# Patient Record
Sex: MALE | Race: WHITE | HISPANIC OR LATINO | Employment: FULL TIME | URBAN - METROPOLITAN AREA
[De-identification: names, ages, dates, MRNs, and addresses within clinical notes are randomized per-mention and may not be internally consistent; named-entity substitution may affect disease eponyms.]

---
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
## 2018-05-18 ENCOUNTER — OFFICE VISIT (OUTPATIENT)
Dept: URGENT CARE | Facility: PHYSICIAN GROUP | Age: 18
End: 2018-05-18
Payer: COMMERCIAL

## 2018-05-18 VITALS
OXYGEN SATURATION: 99 % | HEIGHT: 66 IN | DIASTOLIC BLOOD PRESSURE: 78 MMHG | BODY MASS INDEX: 25.71 KG/M2 | SYSTOLIC BLOOD PRESSURE: 116 MMHG | TEMPERATURE: 98.4 F | WEIGHT: 160 LBS | HEART RATE: 84 BPM

## 2018-05-18 DIAGNOSIS — M54.42 ACUTE LEFT-SIDED LOW BACK PAIN WITH LEFT-SIDED SCIATICA: ICD-10-CM

## 2018-05-18 PROCEDURE — 99204 OFFICE O/P NEW MOD 45 MIN: CPT | Performed by: FAMILY MEDICINE

## 2018-05-18 RX ORDER — IBUPROFEN 200 MG
200 TABLET ORAL EVERY 6 HOURS PRN
COMMUNITY
End: 2018-05-18

## 2018-05-18 RX ORDER — CYCLOBENZAPRINE HCL 10 MG
10 TABLET ORAL
Qty: 15 TAB | Refills: 0 | Status: SHIPPED | OUTPATIENT
Start: 2018-05-18 | End: 2018-06-25

## 2018-05-18 RX ORDER — MELOXICAM 15 MG/1
15 TABLET ORAL DAILY
Qty: 30 TAB | Refills: 0 | Status: SHIPPED | OUTPATIENT
Start: 2018-05-18 | End: 2018-06-25 | Stop reason: SDUPTHER

## 2018-05-18 ASSESSMENT — PAIN SCALES - GENERAL: PAINLEVEL: 9=SEVERE PAIN

## 2018-05-31 ASSESSMENT — ENCOUNTER SYMPTOMS
NUMBNESS: 0
VOMITING: 0
DIZZINESS: 0
CHILLS: 0
SHORTNESS OF BREATH: 0
SORE THROAT: 0
MYALGIAS: 0
EYE PAIN: 0
HIP PAIN: 1
NAUSEA: 0
FEVER: 0
WEAKNESS: 0

## 2018-06-01 NOTE — PROGRESS NOTES
"  Subjective:     Paxton Gómez is a 18 y.o. male who presents for Hip Pain (Left side. Glut pain. x 1-2 years)       Hip Pain   This is a new problem. The current episode started in the past 7 days. The problem occurs constantly. The problem has been rapidly worsening. Pertinent negatives include no chest pain, chills, fever, myalgias, nausea, numbness, rash, sore throat, vomiting or weakness.     Past Medical History:   Diagnosis Date   • NEGATIVE HISTORY OF    History reviewed. No pertinent surgical history.  Social History     Social History   • Marital status: Single     Spouse name: N/A   • Number of children: N/A   • Years of education: N/A     Occupational History   • Not on file.     Social History Main Topics   • Smoking status: Never Smoker   • Smokeless tobacco: Never Used   • Alcohol use No   • Drug use: No   • Sexual activity: No     Other Topics Concern   • Not on file     Social History Narrative    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     Will be playing soccer.     Gets good grades, mostly A's and B's.               Family History   Problem Relation Age of Onset   • Genitourinary () Neg Hx    • Cancer Neg Hx    • Diabetes Neg Hx    • Stroke Neg Hx    • Heart Disease Neg Hx     Review of Systems   Constitutional: Negative for chills and fever.   HENT: Negative for sore throat.    Eyes: Negative for pain.   Respiratory: Negative for shortness of breath.    Cardiovascular: Negative for chest pain.   Gastrointestinal: Negative for nausea and vomiting.   Genitourinary: Negative for hematuria.   Musculoskeletal: Negative for myalgias.   Skin: Negative for rash.   Neurological: Negative for dizziness, weakness and numbness.   No Known Allergies   Objective:   /78   Pulse 84   Temp 36.9 °C (98.4 °F)   Ht 1.676 m (5' 6\")   Wt 72.6 kg (160 lb)   SpO2 99%   BMI 25.82 kg/m²   Physical Exam   Constitutional: He is oriented to person, place, and time. He appears well-developed and well-nourished. No " distress.   HENT:   Head: Normocephalic and atraumatic.   Eyes: Conjunctivae and EOM are normal. Pupils are equal, round, and reactive to light.   Cardiovascular: Normal rate, regular rhythm and intact distal pulses.    No murmur heard.  Pulmonary/Chest: Effort normal and breath sounds normal. No respiratory distress.   Abdominal: Soft. He exhibits no distension. There is no tenderness.   Musculoskeletal:        Left hip: He exhibits tenderness. He exhibits normal range of motion, normal strength and no bony tenderness.        Lumbar back: He exhibits decreased range of motion, tenderness and spasm. He exhibits no bony tenderness.   Neurological: He is alert and oriented to person, place, and time. He has normal reflexes. No sensory deficit.   Skin: Skin is warm and dry.   Psychiatric: He has a normal mood and affect. His behavior is normal.   Vitals reviewed.        Assessment/Plan:   Assessment    1. Acute left-sided low back pain with left-sided sciatica  - meloxicam (MOBIC) 15 MG tablet; Take 1 Tab by mouth every day.  Dispense: 30 Tab; Refill: 0  - cyclobenzaprine (FLEXERIL) 10 MG Tab; Take 1 Tab by mouth at bedtime as needed.  Dispense: 15 Tab; Refill: 0    Differential diagnosis, natural history, supportive care, and indications for immediate follow-up discussed.

## 2018-06-25 ENCOUNTER — OFFICE VISIT (OUTPATIENT)
Dept: MEDICAL GROUP | Age: 18
End: 2018-06-25
Payer: COMMERCIAL

## 2018-06-25 VITALS
TEMPERATURE: 98.9 F | WEIGHT: 152.6 LBS | SYSTOLIC BLOOD PRESSURE: 100 MMHG | DIASTOLIC BLOOD PRESSURE: 64 MMHG | HEIGHT: 66 IN | BODY MASS INDEX: 24.53 KG/M2 | OXYGEN SATURATION: 96 % | HEART RATE: 94 BPM

## 2018-06-25 DIAGNOSIS — Z23 NEED FOR VACCINATION: ICD-10-CM

## 2018-06-25 DIAGNOSIS — G57.02 PIRIFORMIS SYNDROME OF LEFT SIDE: ICD-10-CM

## 2018-06-25 DIAGNOSIS — B07.0 PLANTAR WART: ICD-10-CM

## 2018-06-25 DIAGNOSIS — M54.42 ACUTE LEFT-SIDED LOW BACK PAIN WITH LEFT-SIDED SCIATICA: ICD-10-CM

## 2018-06-25 PROBLEM — M54.32 LEFT SIDED SCIATICA: Status: ACTIVE | Noted: 2018-06-25

## 2018-06-25 PROCEDURE — 90471 IMMUNIZATION ADMIN: CPT | Performed by: PHYSICIAN ASSISTANT

## 2018-06-25 PROCEDURE — 90734 MENACWYD/MENACWYCRM VACC IM: CPT | Performed by: PHYSICIAN ASSISTANT

## 2018-06-25 PROCEDURE — 90472 IMMUNIZATION ADMIN EACH ADD: CPT | Performed by: PHYSICIAN ASSISTANT

## 2018-06-25 PROCEDURE — 90651 9VHPV VACCINE 2/3 DOSE IM: CPT | Performed by: PHYSICIAN ASSISTANT

## 2018-06-25 PROCEDURE — 99213 OFFICE O/P EST LOW 20 MIN: CPT | Mod: 25 | Performed by: PHYSICIAN ASSISTANT

## 2018-06-25 PROCEDURE — 17110 DESTRUCTION B9 LES UP TO 14: CPT | Performed by: PHYSICIAN ASSISTANT

## 2018-06-25 RX ORDER — MELOXICAM 15 MG/1
15 TABLET ORAL DAILY
Qty: 30 TAB | Refills: 0 | Status: SHIPPED | OUTPATIENT
Start: 2018-06-25 | End: 2018-09-11

## 2018-06-25 RX ORDER — METHOCARBAMOL 500 MG/1
500 TABLET, FILM COATED ORAL 3 TIMES DAILY
Qty: 30 TAB | Refills: 0 | Status: SHIPPED | OUTPATIENT
Start: 2018-06-25 | End: 2018-07-23

## 2018-06-25 ASSESSMENT — PATIENT HEALTH QUESTIONNAIRE - PHQ9: CLINICAL INTERPRETATION OF PHQ2 SCORE: 0

## 2018-06-27 PROBLEM — B07.0 PLANTAR WART: Status: ACTIVE | Noted: 2018-06-27

## 2018-06-27 NOTE — ASSESSMENT & PLAN NOTE
This is an ongoing problem.  The patient reports that he was seen for the same problem in urgent care approximately 4 weeks ago, and was given meloxicam and Flexeril at that time.  He took 1 of the Flexeril, and had an allergic reaction which involved swelling in his face.  He did not take anymore.  Since then, he has been using the meloxicam and feels that this does improve his symptoms.  Initially, he did have some numbness and tingling of the left leg, but this has improved.    His pain symptoms are largely improved as well, and he feels that he is making progress.  However, he still feels nervous about bending over to lift.  He tells me that with this motion, he feels a pulling sensation in his left buttock.  He also has tightness in his left thoracic and lumbar paraspinal muscles.  He has been exercising, but not as frequently as previous to this injury.  He does minimal at home stretching, and has been trying to rest.  He believes the initial injury happened during weight lifting in the gym.

## 2018-06-27 NOTE — ASSESSMENT & PLAN NOTE
"This is a new problem.  The patient displays a wartlike growth on the volar aspect of his right second toe.  He tells me it has been there \"for a while.\"  He does not remember when he first noticed it.  He does not feel like it is growing significantly, but he wonders if there is anything that can be done with it.  He has not tried any over-the-counter remedies for this.  "

## 2018-06-27 NOTE — PROGRESS NOTES
"CC: left sided piriformis syndrome, plantar wart, need for vaccination    History of Present Illness: This is a 18 y.o. male new patient who presents today to establish care and discuss the chronic conditions outlined below. This patient previously saw no one for primary care. Other specialists include none. Records for all have been requested. This patient has a past medical history significant for none.    Piriformis syndrome of left side  This is an ongoing problem.  The patient reports that he was seen for the same problem in urgent care approximately 4 weeks ago, and was given meloxicam and Flexeril at that time.  He took 1 of the Flexeril, and had an allergic reaction which involved swelling in his face.  He did not take anymore.  Since then, he has been using the meloxicam and feels that this does improve his symptoms.  Initially, he did have some numbness and tingling of the left leg, but this has improved.    His pain symptoms are largely improved as well, and he feels that he is making progress.  However, he still feels nervous about bending over to lift.  He tells me that with this motion, he feels a pulling sensation in his left buttock.  He also has tightness in his left thoracic and lumbar paraspinal muscles.  He has been exercising, but not as frequently as previous to this injury.  He does minimal at home stretching, and has been trying to rest.  He believes the initial injury happened during weight lifting in the gym.    Plantar wart  This is a new problem.  The patient displays a wartlike growth on the volar aspect of his right second toe.  He tells me it has been there \"for a while.\"  He does not remember when he first noticed it.  He does not feel like it is growing significantly, but he wonders if there is anything that can be done with it.  He has not tried any over-the-counter remedies for this.      Patient Active Problem List    Diagnosis Date Noted   • Plantar wart 06/27/2018   • Piriformis " syndrome of left side 06/25/2018          Additional History:     Allergies   Allergen Reactions   • Cyclobenzaprine Swelling       Current medicines (including changes today)  Current Outpatient Prescriptions   Medication Sig Dispense Refill   • methocarbamol (ROBAXIN) 500 MG Tab Take 1 Tab by mouth 3 times a day. 30 Tab 0   • meloxicam (MOBIC) 15 MG tablet Take 1 Tab by mouth every day. 30 Tab 0     No current facility-administered medications for this visit.      He  has a past medical history of Left sided sciatica (6/25/2018); NEGATIVE HISTORY OF; and Plantar wart (6/27/2018). He also has no past medical history of Asthma; Cancer (HCC); Diabetes (HCC); Hyperlipidemia; or Hypertension.  He  has no past surgical history on file.  Social History   Substance Use Topics   • Smoking status: Never Smoker   • Smokeless tobacco: Never Used   • Alcohol use No       Family History   Problem Relation Age of Onset   • Diabetes Father    • Diabetes Maternal Grandmother    • Diabetes Maternal Grandfather    • Diabetes Paternal Grandmother    • Diabetes Paternal Grandfather    • Genitourinary () Neg Hx    • Cancer Neg Hx    • Stroke Neg Hx    • Heart Disease Neg Hx      Family Status   Relation Status   • Mother Alive   • Father Alive   • Maternal Grandmother    • Maternal Grandfather    • Paternal Grandmother    • Paternal Grandfather    • Neg Hx        Patient Active Problem List    Diagnosis Date Noted   • Plantar wart 06/27/2018   • Piriformis syndrome of left side 06/25/2018         Review of Systems:   Constitutional: Negative for fever, chills, unexpected weight change, fatigue, malaise and generalized weakness.   Eyes: Negative for blurred or double vision, eye pain, eye discharge.  ENT: Negative for headaches, hearing changes, ear pain, ear discharge, rhinorrhea, sinus congestion, sore throat, and neck pain.   Respiratory: Negative for cough, sputum production, chest congestion, dyspnea, wheezing, and crackles.  "  Cardiovascular: Negative for chest pain, palpitations, orthopnea, and bilateral lower extremity edema.   Gastrointestinal: Negative for heartburn, nausea, vomiting, abdominal pain, hematochezia, melena, diarrhea, constipation, and greasy/foul-smelling stools.   Genitourinary: Negative for dysuria, polyuria, hematuria, pyuria, urgency, frequency and incontinence.  Musculoskeletal: Positive for left sided piriformis syndrome symptoms as in HPI. Negative for myalgias, back pain, and joint pain.   Skin: Plantar wart of right second toe. Negative for rash, itching, cyanotic skin color change.   Neurological: Negative for dizziness, tingling, tremors, focal sensory deficit, focal weakness and headaches.   Heme: Does not bruise/bleed easily.    Endocrine: Negative for heat or cold intolerance, polydipsia, polyuria.  Psychiatric/Behavioral: Negative for depression, suicidal or homicidal ideation and memory loss.         Physical Exam:   Vitals: Blood pressure 100/64, pulse 94, temperature 37.2 °C (98.9 °F), height 1.683 m (5' 6.25\"), weight 69.2 kg (152 lb 9.6 oz), SpO2 96 %.  BMI: Body mass index is 24.44 kg/m².  General/Constitutional: Vitals as above, well nourished, well developed male in no acute distress  Head: Head is grossly normal & atraumatic.  Eyes: Bilateral conjunctivae clear and not injected, bilateral EOMI, bilateral PERRL  ENT: Bilateral external ears grossly normal in appearance, EACs clear & bilateral TMs visualized with appropriate cone of light reflex, hearing grossly intact. External nares normal in appearance and without discharge or bleeding, bilateral turbinates without erythema or edema and without discharge or bleeding. Good dentition, posterior oropharynx without erythema/edema/exudates.  Neck: Neck supple, no masses, neck non-tender to palpation, no thyromegaly/goiter.  Lymph: No adenopathy in anterior/posterior cervical and supra-/infrascapular nodes.   Respiratory: Normal effort, lungs are " clear to auscultation in all fields (anterior, lateral, posterior), no wheezing, rhonchi or rales.  Cardiovascular: Regular rate and rhythm without murmurs, gallops or rubs, no bilateral lower extremity edema.  Musculoskeletal: Gait grossly normal & not antalgic, no tenderness to percussion of vertebral processes, no CVAT.  Back Pain Exam of Thoracic and Lumbar Spine  Inspection of back and posture - revealed a normal exam  Range of motion = 180 degrees bilaterally  Palpation of the spinous processes = NTTP  Back:  Back symmetric, no curvature. ROM normal. No CVA tenderness.  No spasms noted, no lumbar spine tenderness, no saddle anasthesia, able to dorsiflex bilateral toes, 2+DTRs (patellar) bilaterally, Negative straight leg raise bilaterally both supine and seated (though patient does note some pulling sensation with left sided supine SLR), nontender bilateral erector spinae, normal gait. Tender to palpation at left lateral hip and paraspinal muscles around the level of T10-L1.  Skin: Plantar wart of volar aspect of right second toe. Warm and dry with no apparent rashes or lesions.  Neuro: Gross motor movement intact in all 4 extremities, gross sensation intact to extremities and trunk, gait grossly normal and not antalgic.  Cranial nerve examination: Pupils equally round and react to light. Extraocular muscles are intact. Visual fields intact. No facial droop. Hearing intact to conversation. Soft palate rises symmetrically bilaterally with uvula midline. Tongue midline and cranial nerve 12 intact. No abnormal facial movements. Resisted shoulder shrug 5/5 bilaterally.  Psych: Judgment grossly appropriate, no apparent depression/anxiety.    Cryotherapy was administered in three rounds to the plantar wart of the right great toe. The patient tolerated this procedure well without complications or complaints. He will return in three weeks for recheck of the lesion.    Health Maintenance: Immunizations updated today.      Imaging/Labs:  N/A    Assessment/Plan:  Care has been established  We reviewed USPSTF guidelines  Records requests sent to previous care providers  Denies intimate partner violence    1. Piriformis syndrome of left side  Improving.  The patient would like a refill on the meloxicam, as he is almost out.  I also think he will benefit from use of a muscle relaxer, as he was unable to use the Flexeril due to allergy.  He also requests a referral to physical therapy to work on bilateral strengthening exercises.  I am concerned that he is doing a little bit of compensation on his right side which may cause imbalance issues down the road.  - REFERRAL TO PHYSICAL THERAPY Reason for Therapy: Eval/Treat/Report  - methocarbamol (ROBAXIN) 500 MG Tab; Take 1 Tab by mouth 3 times a day.  Dispense: 30 Tab; Refill: 0  - meloxicam (MOBIC) 15 MG tablet; Take 1 Tab by mouth every day.  Dispense: 30 Tab; Refill: 0    2. Need for vaccination  - [id number]  - Gardasil 9    3. Plantar wart  Cryotherapy administered to the lesion.  Will recheck in 3 weeks.      Return in about 3 weeks (around 7/16/2018).      Please note that this dictation was created using voice recognition software. I have made every reasonable attempt to correct obvious errors, but I expect that there are errors of grammar and possibly content that I did not discover before finalizing the note.

## 2018-07-20 ENCOUNTER — OFFICE VISIT (OUTPATIENT)
Dept: MEDICAL GROUP | Age: 18
End: 2018-07-20
Payer: COMMERCIAL

## 2018-07-20 VITALS
OXYGEN SATURATION: 97 % | BODY MASS INDEX: 25.39 KG/M2 | SYSTOLIC BLOOD PRESSURE: 118 MMHG | TEMPERATURE: 98.3 F | HEART RATE: 78 BPM | DIASTOLIC BLOOD PRESSURE: 76 MMHG | HEIGHT: 66 IN | WEIGHT: 158 LBS

## 2018-07-20 DIAGNOSIS — M40.203 KYPHOSIS OF CERVICOTHORACIC REGION, UNSPECIFIED KYPHOSIS TYPE: ICD-10-CM

## 2018-07-20 DIAGNOSIS — G57.02 PIRIFORMIS SYNDROME OF LEFT SIDE: ICD-10-CM

## 2018-07-20 DIAGNOSIS — B07.0 PLANTAR WART: ICD-10-CM

## 2018-07-20 PROCEDURE — 17110 DESTRUCTION B9 LES UP TO 14: CPT | Performed by: PHYSICIAN ASSISTANT

## 2018-07-20 PROCEDURE — 99214 OFFICE O/P EST MOD 30 MIN: CPT | Mod: 25 | Performed by: PHYSICIAN ASSISTANT

## 2018-07-20 NOTE — ASSESSMENT & PLAN NOTE
"This is an ongoing problem.  The patient reports that his tailbone hurts when bending over, and describes a \"pulling\" pain in lower left side of back.  Essentially, this has been ongoing for the past year with an exacerbation that began approximately 3 months ago.  3 months ago, this pain became severe and was worse with squatting and bending.  This exacerbation has seemed to resolve, and the patient reports that he cannot squat with no pain.  However, bending still elicits this pulling sensation, and he is concerned.    He denies any unilateral leg symptoms like numbness, or tingling.  He denies any incontinence of bowel or bladder.  He reports that this pain is bothersome, but does not significantly impact his day-to-day life.  "

## 2018-07-23 PROBLEM — M40.203 KYPHOSIS OF CERVICOTHORACIC REGION: Status: ACTIVE | Noted: 2018-07-23

## 2018-07-23 NOTE — PROGRESS NOTES
"CC: piriformis syndrome.     History of Present Illness: This is a 18 y.o. male established patient who presents today for follow up.     Piriformis syndrome of left side  This is an ongoing problem.  The patient reports that his tailbone hurts when bending over, and describes a \"pulling\" pain in lower left side of back.  Essentially, this has been ongoing for the past year with an exacerbation that began approximately 3 months ago.  3 months ago, this pain became severe and was worse with squatting and bending.  This exacerbation has seemed to resolve, and the patient reports that he cannot squat with no pain.  However, bending still elicits this pulling sensation, and he is concerned.    He denies any unilateral leg symptoms like numbness, or tingling.  He denies any incontinence of bowel or bladder.  He reports that this pain is bothersome, but does not significantly impact his day-to-day life.    Plantar wart  Lesion still present on the volar aspect of his right second toe.  He reports no change since her last cryotherapy application, and does not have any tools at home to manage this.  We discussed that he may get a file and over-the-counter but awful and to help rid himself of this work on his own if he does not want to return for further cryo treatments.    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  The patient has a markedly kyphotic cervicothoracic spine, and reports that he has always had this.  He does not have any significant pain due to this, but is concerned that he will develop problems later in his life.  He is interested in a referral to orthopedics for consultation.      Patient Active Problem List    Diagnosis Date Noted   • Kyphosis of cervicothoracic region 07/23/2018   • Plantar wart 06/27/2018   • Piriformis syndrome of left side 06/25/2018      Allergies:Cyclobenzaprine    Current Outpatient Prescriptions   Medication Sig Dispense Refill   • meloxicam (MOBIC) 15 MG " "tablet Take 1 Tab by mouth every day. 30 Tab 0     No current facility-administered medications for this visit.        Social History   Substance Use Topics   • Smoking status: Never Smoker   • Smokeless tobacco: Never Used   • Alcohol use No     Social History     Social History Narrative    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     Will be playing soccer.     Gets good grades, mostly A's and B's.                Family History   Problem Relation Age of Onset   • Diabetes Father    • Diabetes Maternal Grandmother    • Diabetes Maternal Grandfather    • Diabetes Paternal Grandmother    • Diabetes Paternal Grandfather    • Genitourinary () Neg Hx    • Cancer Neg Hx    • Stroke Neg Hx    • Heart Disease Neg Hx        Review of Systems:    Constitutional: Negative for fever, chills.   Musculoskeletal: Positive for left lower back pain, exaggerated kyphosis of cericothoracic spine. Negative for myalgias, and joint pain.   Skin: Negative for rash, itching, cyanotic skin color change.   Neurological: Negative for dizziness, tingling, tremors, focal sensory deficit, focal weakness and headaches.   Psychiatric/Behavioral: Negative for depression, suicidal/homicidal ideation and memory loss.            Exam:    Blood pressure 118/76, pulse 78, temperature 36.8 °C (98.3 °F), height 1.683 m (5' 6.25\"), weight 71.7 kg (158 lb), SpO2 97 %. Body mass index is 25.31 kg/m².    General: Normal appearing. No distress.  Eyes: Conjunctiva clear, lids without ptosis, pupils equal, round and reactive to light  Musculoskeletal: Normal gait. No extremity cyanosis, clubbing, or edema.  Back Pain Exam of Thoracic and Lumbar Spine  Inspection of back and posture - revealed a normal exam  Range of motion = 180 degrees bilaterally  Palpation of the spinous processes = NTTP  Back:  positive findings: exaggerated kyphosis of cervicothoracic spine.  No spasms noted, no lumbar spine tenderness, no saddle anasthesia, able to dorsiflex bilateral toes, 2+DTRs " (patellar) bilaterally, negative straight leg raise bilaterally both supine and seated, nontender bilateral erector spinae, normal gait. Tender to palpation over left SI joint.   Neurologic: Grossly non-focal, DTRs intact.  Skin: Positive for plantar wart of right second toe, unchanged from previous visit. Warm and dry.  No obvious lesions.  Psych: Normal mood and affect. Alert and oriented x3. Judgment and insight is normal.      Health Maintenance: Completed    Assessment/Plan:  1. Piriformis syndrome of left side  Uncontrolled.  We will check an x-ray to determine if there is any bony abnormality of his left SI joint.   - DX-SACROILIAC JOINTS 3+; Future    2. Kyphosis of cervicothoracic region, unspecified kyphosis type  Uncontrolled.  The patient is requesting a referral to orthopedics for consultation regarding the abnormal curvature of the spine.  This has been sent.  - DX-SPINE-SCOLIOSIS STUDY; Future  - REFERRAL TO ORTHOPEDICS    3. Plantar wart  3 applications of cryotherapy were administered to the patient's plantar wart without complication.  We discussed that off 1 and filing as home management techniques, or the patient may continue to return for additional medications.      Return in about 3 weeks (around 8/10/2018) for Follow up imaging.    Patient was in agreement with this treatment plan and seemed to understand without barriers. All questions were encouraged and answered. Reviewed signs and symptoms of when to seek emergency medical care.     Please note that this dictation was created using voice recognition software. I have made every reasonable attempt to correct obvious errors, but I expect that there may be errors of grammar and possibly content that I did not discover before finalizing the note.

## 2018-07-23 NOTE — ASSESSMENT & PLAN NOTE
Lesion still present on the volar aspect of his right second toe.  He reports no change since her last cryotherapy application, and does not have any tools at home to manage this.  We discussed that he may get a file and over-the-counter but awful and to help rid himself of this work on his own if he does not want to return for further cryo treatments.

## 2018-07-23 NOTE — ASSESSMENT & PLAN NOTE
This is a chronic problem.  The patient has a markedly kyphotic cervicothoracic spine, and reports that he has always had this.  He does not have any significant pain due to this, but is concerned that he will develop problems later in his life.  He is interested in a referral to orthopedics for consultation.

## 2018-07-24 ENCOUNTER — HOSPITAL ENCOUNTER (OUTPATIENT)
Dept: RADIOLOGY | Facility: MEDICAL CENTER | Age: 18
End: 2018-07-24
Attending: PHYSICIAN ASSISTANT
Payer: COMMERCIAL

## 2018-07-24 DIAGNOSIS — G57.02 PIRIFORMIS SYNDROME OF LEFT SIDE: ICD-10-CM

## 2018-07-24 PROCEDURE — 72202 X-RAY EXAM SI JOINTS 3/> VWS: CPT

## 2018-07-26 ENCOUNTER — HOSPITAL ENCOUNTER (OUTPATIENT)
Dept: RADIOLOGY | Facility: MEDICAL CENTER | Age: 18
End: 2018-07-26
Attending: PHYSICIAN ASSISTANT
Payer: COMMERCIAL

## 2018-07-26 DIAGNOSIS — M40.203 KYPHOSIS OF CERVICOTHORACIC REGION, UNSPECIFIED KYPHOSIS TYPE: ICD-10-CM

## 2018-07-26 PROCEDURE — 72081 X-RAY EXAM ENTIRE SPI 1 VW: CPT

## 2018-08-13 ENCOUNTER — APPOINTMENT (OUTPATIENT)
Dept: MEDICAL GROUP | Age: 18
End: 2018-08-13
Payer: COMMERCIAL

## 2018-09-11 ENCOUNTER — OFFICE VISIT (OUTPATIENT)
Dept: MEDICAL GROUP | Age: 18
End: 2018-09-11
Payer: COMMERCIAL

## 2018-09-11 VITALS
BODY MASS INDEX: 26 KG/M2 | SYSTOLIC BLOOD PRESSURE: 116 MMHG | OXYGEN SATURATION: 95 % | HEART RATE: 74 BPM | HEIGHT: 66 IN | WEIGHT: 161.8 LBS | TEMPERATURE: 97.6 F | DIASTOLIC BLOOD PRESSURE: 80 MMHG

## 2018-09-11 DIAGNOSIS — Z23 NEED FOR VACCINATION: ICD-10-CM

## 2018-09-11 DIAGNOSIS — G57.02 PIRIFORMIS SYNDROME OF LEFT SIDE: ICD-10-CM

## 2018-09-11 DIAGNOSIS — B07.0 PLANTAR WART: ICD-10-CM

## 2018-09-11 DIAGNOSIS — M40.203 KYPHOSIS OF CERVICOTHORACIC REGION, UNSPECIFIED KYPHOSIS TYPE: ICD-10-CM

## 2018-09-11 PROCEDURE — 17110 DESTRUCTION B9 LES UP TO 14: CPT | Performed by: PHYSICIAN ASSISTANT

## 2018-09-11 PROCEDURE — 90471 IMMUNIZATION ADMIN: CPT | Performed by: PHYSICIAN ASSISTANT

## 2018-09-11 PROCEDURE — 99213 OFFICE O/P EST LOW 20 MIN: CPT | Mod: 25 | Performed by: PHYSICIAN ASSISTANT

## 2018-09-11 PROCEDURE — 90686 IIV4 VACC NO PRSV 0.5 ML IM: CPT | Performed by: PHYSICIAN ASSISTANT

## 2018-09-12 NOTE — ASSESSMENT & PLAN NOTE
Reports that he is having some recurrence of his left buttock and hip pain, and notes that it is worse when lifting heavy weights. He is doing a home stretching program that has helped with his symptoms, but is interested in trying PT. His previous referral has been closed, as he told them he was not interested in PT at that time. I have asked him to discuss this with [facility] who he is consulting with on 9/13/2018. No numbness on tingling of the lower extremities, no changes to urinary or bowel movements.

## 2018-09-12 NOTE — ASSESSMENT & PLAN NOTE
Here today for retreatment of his plantar wart. He reports no significant change since our last treatment, but has been using a metal file at home to shave off the hypertrophic tissue. He has not used any home or OTC remedies.

## 2018-09-12 NOTE — PROGRESS NOTES
CC: kyphosis, warts, piriformis syndrome    History of Present Illness: This is a 18 y.o. male established patient who presents today for follow up.     Plantar wart  Here today for retreatment of his plantar wart. He reports no significant change since our last treatment, but has been using a metal file at home to shave off the hypertrophic tissue. He has not used any home or OTC remedies.     Piriformis syndrome of left side  Reports that he is having some recurrence of his left buttock and hip pain, and notes that it is worse when lifting heavy weights. He is doing a home stretching program that has helped with his symptoms, but is interested in trying PT. His previous referral has been closed, as he told them he was not interested in PT at that time. I have asked him to discuss this with PMNR who he is consulting with on 9/13/2018. No numbness on tingling of the lower extremities, no changes to urinary or bowel movements.     Kyphosis of cervicothoracic region  Scheduled for consultation with PMNR at [facility] on 9/13/2018.      Patient Active Problem List    Diagnosis Date Noted   • Kyphosis of cervicothoracic region 07/23/2018   • Plantar wart 06/27/2018   • Piriformis syndrome of left side 06/25/2018      Allergies:Cyclobenzaprine    No current outpatient prescriptions on file.     No current facility-administered medications for this visit.        Social History   Substance Use Topics   • Smoking status: Never Smoker   • Smokeless tobacco: Never Used   • Alcohol use No     Social History     Social History Narrative    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     Will be playing soccer.     Gets good grades, mostly A's and B's.                Family History   Problem Relation Age of Onset   • Diabetes Father    • Diabetes Maternal Grandmother    • Diabetes Maternal Grandfather    • Diabetes Paternal Grandmother    • Diabetes Paternal Grandfather    • Genitourinary () Neg Hx    • Cancer Neg Hx    • Stroke Neg Hx    • Heart Disease Neg  "Hx        Review of Systems:    Constitutional: Negative for fever, chills.   Musculoskeletal: Positive for worsening of his left hip and buttock pain. Negative for myalgias, back pain, and joint pain.   Skin: Positive for plantar wart of right 4th toe. Negative for rash, itching, cyanotic skin color change.   Neurological: Negative for dizziness, tingling, tremors, focal sensory deficit, focal weakness and headaches.   Psychiatric/Behavioral: Negative for depression, suicidal/homicidal ideation and memory loss.            Exam:    Blood pressure 116/80, pulse 74, temperature 36.4 °C (97.6 °F), height 1.676 m (5' 6\"), weight 73.4 kg (161 lb 12.8 oz), SpO2 95 %. Body mass index is 26.12 kg/m².    General: Normal appearing. No distress.  Eyes: Conjunctiva clear, lids without ptosis, pupils equal, round and reactive to light  Musculoskeletal: Marked kyphosis. Normal gait. No extremity cyanosis, clubbing, or edema.  Neurologic: Grossly non-focal, DTRs intact.  Skin: Plantar wart of right 4th toe that is unchanged in appearance from previous. Warm and dry.  No obvious lesions.  Psych: Normal mood and affect. Alert and oriented x3. Judgment and insight is normal.      Health Maintenance: Completed    Assessment/Plan:  1. Need for vaccination  - INFLUENZA VACCINE QUAD INJ >3Y(PF)    2. Piriformis syndrome of left side  Worsening. Follow up with PMNR as scheduled for PT referral and other recommendations.     3. Plantar wart  Cut down and cryotherapy treatment done in clinic. Patient tolerated procedure well without significant pain or any blood loss. He will try OTC home remedies to the site daily and return as needed for retreatment.     4. Kyphosis of cervicothoracic region, unspecified kyphosis type  Stable. Follow up with PMNR as scheduled.       Return if symptoms worsen or fail to improve.    Patient was in agreement with this treatment plan and seemed to understand without barriers. All questions were encouraged and " answered. Reviewed signs and symptoms of when to seek emergency medical care.     Please note that this dictation was created using voice recognition software. I have made every reasonable attempt to correct obvious errors, but I expect that there may be errors of grammar and possibly content that I did not discover before finalizing the note.

## 2019-01-09 ENCOUNTER — HOSPITAL ENCOUNTER (EMERGENCY)
Facility: MEDICAL CENTER | Age: 19
End: 2019-01-09
Attending: EMERGENCY MEDICINE
Payer: COMMERCIAL

## 2019-01-09 VITALS
BODY MASS INDEX: 25.55 KG/M2 | TEMPERATURE: 97.7 F | OXYGEN SATURATION: 98 % | RESPIRATION RATE: 18 BRPM | DIASTOLIC BLOOD PRESSURE: 73 MMHG | WEIGHT: 158.95 LBS | HEART RATE: 80 BPM | HEIGHT: 66 IN | SYSTOLIC BLOOD PRESSURE: 124 MMHG

## 2019-01-09 DIAGNOSIS — M54.50 LUMBAR BACK PAIN: ICD-10-CM

## 2019-01-09 PROCEDURE — A9270 NON-COVERED ITEM OR SERVICE: HCPCS | Performed by: EMERGENCY MEDICINE

## 2019-01-09 PROCEDURE — 96374 THER/PROPH/DIAG INJ IV PUSH: CPT

## 2019-01-09 PROCEDURE — 99285 EMERGENCY DEPT VISIT HI MDM: CPT

## 2019-01-09 PROCEDURE — 700102 HCHG RX REV CODE 250 W/ 637 OVERRIDE(OP): Performed by: EMERGENCY MEDICINE

## 2019-01-09 PROCEDURE — 700111 HCHG RX REV CODE 636 W/ 250 OVERRIDE (IP)

## 2019-01-09 RX ORDER — DIAZEPAM 5 MG/1
10 TABLET ORAL ONCE
Status: COMPLETED | OUTPATIENT
Start: 2019-01-09 | End: 2019-01-09

## 2019-01-09 RX ORDER — KETOROLAC TROMETHAMINE 30 MG/ML
INJECTION, SOLUTION INTRAMUSCULAR; INTRAVENOUS
Status: COMPLETED
Start: 2019-01-09 | End: 2019-01-09

## 2019-01-09 RX ORDER — METHOCARBAMOL 750 MG/1
1500 TABLET, FILM COATED ORAL 3 TIMES DAILY
Qty: 45 TAB | Refills: 0 | Status: SHIPPED | OUTPATIENT
Start: 2019-01-09 | End: 2019-01-16

## 2019-01-09 RX ORDER — NAPROXEN 500 MG/1
500 TABLET ORAL 2 TIMES DAILY WITH MEALS
Qty: 30 TAB | Refills: 0 | Status: SHIPPED | OUTPATIENT
Start: 2019-01-09 | End: 2019-01-21

## 2019-01-09 RX ORDER — DIAZEPAM 5 MG/ML
10 INJECTION, SOLUTION INTRAMUSCULAR; INTRAVENOUS ONCE
Status: DISCONTINUED | OUTPATIENT
Start: 2019-01-09 | End: 2019-01-09

## 2019-01-09 RX ORDER — AMOXICILLIN 500 MG/1
500 CAPSULE ORAL 4 TIMES DAILY
Status: SHIPPED | COMMUNITY
Start: 2018-12-10 | End: 2023-07-19

## 2019-01-09 RX ORDER — HYDROCODONE BITARTRATE AND ACETAMINOPHEN 5; 325 MG/1; MG/1
1 TABLET ORAL ONCE
Status: COMPLETED | OUTPATIENT
Start: 2019-01-09 | End: 2019-01-09

## 2019-01-09 RX ORDER — IBUPROFEN 200 MG
600 TABLET ORAL EVERY 6 HOURS PRN
Status: SHIPPED | COMMUNITY
End: 2019-01-21

## 2019-01-09 RX ORDER — KETOROLAC TROMETHAMINE 30 MG/ML
30 INJECTION, SOLUTION INTRAMUSCULAR; INTRAVENOUS ONCE
Status: COMPLETED | OUTPATIENT
Start: 2019-01-09 | End: 2019-01-09

## 2019-01-09 RX ADMIN — HYDROCODONE BITARTRATE AND ACETAMINOPHEN 1 TABLET: 5; 325 TABLET ORAL at 14:17

## 2019-01-09 RX ADMIN — KETOROLAC TROMETHAMINE 30 MG: 30 INJECTION, SOLUTION INTRAMUSCULAR at 13:38

## 2019-01-09 RX ADMIN — KETOROLAC TROMETHAMINE 30 MG: 30 INJECTION, SOLUTION INTRAMUSCULAR; INTRAVENOUS at 13:38

## 2019-01-09 RX ADMIN — DIAZEPAM 10 MG: 5 TABLET ORAL at 13:50

## 2019-01-09 ASSESSMENT — PAIN SCALES - GENERAL
PAINLEVEL_OUTOF10: 7
PAINLEVEL_OUTOF10: 4

## 2019-01-09 NOTE — ED PROVIDER NOTES
ED Provider Note    ED Provider Note    Primary care provider: Addis Shelton P.A.-C.  Means of arrival: EMS  History obtained from: Patient    CHIEF COMPLAINT  Chief Complaint   Patient presents with   • Back Pain     was lifting a heavy tray while at work and strained his back. Pt states when he leans back he gets sharp buring pain in his lower back      Seen at 1:11 PM.   HPI  Paxton Gómez is a 18 y.o. male who presents to the Emergency Department sudden onset of severe lower back pain.  The patient was at work when he was lifting a tray that appear to be heavier than he initially suspected.  He noted severe pain in the lower back that does radiate down the left leg.  He was unable to straighten up following the incident and came here by EMS.    He denies any prior history of chronic abdominal pain or back pain.  He denies any known weakness.  He notes some mild tingling on the left thigh.  He has not had any incontinence.    REVIEW OF SYSTEMS  See HPI,   Remainder of ROS negative.     PAST MEDICAL HISTORY   has a past medical history of Left sided sciatica (6/25/2018); NEGATIVE HISTORY OF; and Plantar wart (6/27/2018).    SURGICAL HISTORY  patient denies any surgical history    SOCIAL HISTORY  Social History   Substance Use Topics   • Smoking status: Never Smoker   • Smokeless tobacco: Never Used   • Alcohol use No      History   Drug Use No       FAMILY HISTORY  Family History   Problem Relation Age of Onset   • Diabetes Father    • Diabetes Maternal Grandmother    • Diabetes Maternal Grandfather    • Diabetes Paternal Grandmother    • Diabetes Paternal Grandfather    • Genitourinary () Neg Hx    • Cancer Neg Hx    • Stroke Neg Hx    • Heart Disease Neg Hx        CURRENT MEDICATIONS  Reviewed.  See Encounter Summary.     ALLERGIES  Allergies   Allergen Reactions   • Cyclobenzaprine Swelling     Pt reports that it made his face swell       PHYSICAL EXAM  VITAL SIGNS: /73   Pulse 80    "Temp 36.5 °C (97.7 °F) (Temporal)   Resp 18   Ht 1.676 m (5' 6\")   Wt 72.1 kg (158 lb 15.2 oz)   SpO2 98%   BMI 25.66 kg/m²   Constitutional: Awake, alert in no apparent distress.  Patient is sitting up on the gurney in hyperflexion of the lumbar spine, the patient essentially is leaning forward with his head tilted down towards his knees.  HENT: Normocephalic, Bilateral external ears normal. Nose normal.   Eyes: Conjunctiva normal, non-icteric, EOMI.    Thorax & Lungs: Easy unlabored respirations, Clear to ascultation bilaterally.  Cardiovascular: Regular rate, Regular rhythm, No murmurs, rubs or gallops.  Abdomen:  Soft, nontender, nondistended, normal active bowel sounds.   :    Skin: Visualized skin is  Dry, No erythema, No rash.   Musculoskeletal:   No cyanosis, clubbing or edema.  Mild kyphosis.  No midline tenderness throughout the L-spine, it is grossly normal in appearance.  Neurologic: Alert, Grossly non-focal.  Sensation intact light touch throughout.  Deep tendon reflexes 1+ bilaterally.  Psychiatric: Normal affect, Normal mood  Lymphatic:  No cervical LAD    RADIOLOGY  MR-LUMBAR SPINE-W/O    (Results Pending)         COURSE & MEDICAL DECISION MAKING  Pertinent Labs & Imaging studies reviewed. (See chart for details)      1:11 PM - Medical record reviewed, patient seen and examined at bedside.  Patient was given Valium and Toradol.  On the medical record there were outpatient visits in the past for back pain.    2:12 PM-following assessment he is now able to sit in a normal position.  He does still appear uncomfortable.  On palpation of the midline, there is no midline tenderness of the patient does have bilateral paraspinal tenderness in the lumbar region.    3:01 PM -patient is asking for an outpatient MRI.      Decision Making:  This is a 18 y.o. year old male who presents with abrupt onset of severe lower back pain after some heavy lifting.  The presentation is most consistent with back spasms " versus HNP.  I do not suspect aortic dissection, first of all his pain is extremely low, well past the aortic bifurcation, second of all he is only minimally hypertensive, thirdly his pain is reproducible in the paraspinal region.    He does not have any findings concerning for cauda equina, he is neurologically intact.  His presentation is not concerning for epidural abscess or hematoma.  Supportive care was discussed, he did receive 10 mg of Valium and 30 mg of Toradol with significant improvement and was moving more freely.  The family was asking for an MRI, I explained that this is not required emergently as he is neurologically intact.  Occupational health/Workmen's Comp. has approved an MRI, therefore I did order an outpatient MRI, he can follow-up with the outpatient health clinic for this.  .  He is directed to return for any severe abdominal pain, lightheadedness, fevers, numbness, weakness, incontinence or urinary retention.    Discharge Medications:  Discharge Medication List as of 1/9/2019  3:56 PM      START taking these medications    Details   naproxen (NAPROSYN) 500 MG Tab Take 1 Tab by mouth 2 times a day, with meals., Disp-30 Tab, R-0, Normal      methocarbamol (ROBAXIN) 750 MG Tab Take 2 Tabs by mouth 3 times a day., Disp-45 Tab, R-0, Normal             The patient was discharged home (see d/c instructions) and parent was told to return immediately for any signs or symptoms listed, or any worsening at all.  The patient's parent verbally agreed to the discharge precautions and follow-up plan which is documented in EPIC.    The patient's blood pressure is elevated today. >120/80. I have referred them to primary care for follow up.       FINAL IMPRESSION  1. Lumbar back pain

## 2019-01-09 NOTE — ED TRIAGE NOTES
".  Chief Complaint   Patient presents with   • Back Pain     was lifting a heavy tray while at work and strained his back. Pt states when he leans back he gets sharp buring pain in his lower back      ./90   Pulse (!) 115   Temp 37.2 °C (99 °F) (Temporal)   Resp 17   Ht 1.676 m (5' 6\")   Wt 72.1 kg (158 lb 15.2 oz)   SpO2 95%   BMI 25.66 kg/m²     "

## 2019-01-09 NOTE — DISCHARGE PLANNING
SW received phone call from Chidi roberts work comp ([phone]) attempting to authorize the outpatient MRI.

## 2019-01-09 NOTE — ED NOTES
Med rec complete per pt at bedside & pt home pharmacy  Allergies have been verified and updated  Pt reports that he finished a 7 day course of AMOXIL on 12-

## 2019-01-09 NOTE — LETTER
"  FORM C-4:  EMPLOYEE’S CLAIM FOR COMPENSATION/ REPORT OF INITIAL TREATMENT  EMPLOYEE’S CLAIM - PROVIDE ALL INFORMATION REQUESTED   First Name  Paxton Last Name  Solitario Gómez Birthdate             Age  2000 18 y.o. Sex  male Claim Number   Home Employee Address  [address]  [id number]  [place]                                     Zip  [address] Height  1.676 m (5' 6\") Weight  72.1 kg (158 lb 15.2 oz) [facility]     Mailing Employee Address                           [id number]   [place]               Zip  [address] Telephone  [phone] (home)  Primary Language Spoken  ENGLISH   Insurer   Third Party   [employer] Employee's Occupation (Job Title) When Injury or Occupation           [employer]     Employer's Name  [employer] Telephone  [phone]    Employer Address  [address] Zip  [address]   Date of Injury  1/9/2019       Hour of Injury  11:45 AM Date Employer Notified  1/9/2019 Last Day of Work after Injury or Occupational Disease  1/9/2019 Supervisor to Whom Injury Reported  Leif Torres    Address or Location of Accident (if applicable)  [[address] ]   What were you doing at the time of accident? (if applicable)  taking out urine pan from primate cage     How did this injury or occupational disease occur? Be specific and answer in detail. Use additional sheet if necessary)  I was taking the urin pan out from the cage  lost   pan pulled/yanked me down with heavy weight as I was bent over I felt a strain and could Not stand up straight w/out sharp pain.    If you believe that you have an occupational disease, when did you first have knowledge of the disability and it relationship to your employment?  N/A Witnesses to the Accident  Isaias Sy jamie west     Nature of Injury or Occupational Disease  Workers' Compensation  " Part(s) of Body Injured or Affected  Lumbar and/or Sacral Vertebrae (Vertebrae NOC Trunk), Upper Back Area (Thoracic Area), Lower Back Area (Lumbar Area & Lumbo-Sacral)    I certify that the above is true and correct to the best of my knowledge and that I have provided this information in order to obtain the benefits of [place]  I hereby authorize any physician, chiropractor, surgeon, practitioner, or other person, any hospital, including [facility] or [facility], any medical service organization, any insurance company, or other institution or organization to release to each other, any medical or other information, including benefits paid or payable, pertinent to this injury or disease, except information relative to diagnosis, treatment and/or counseling for AIDS, psychological conditions, alcohol or controlled substances, for which I must give specific authorization.  A Photostat of this authorization shall be as valid as the original.   Date  January 9, 2019 Place  [facility] Employee’s Signature   THIS REPORT MUST BE COMPLETED AND MAILED WITHIN 3 WORKING DAYS OF TREATMENT   Place  [facility], EMERGENCY DEPT  Name of Facility   [facility]   Date  1/9/2019 Diagnosis  (M54.5) Lumbar back pain Is there evidence the injured employee was under the influence of alcohol and/or another controlled substance at the time of accident?   Hour  3:38 PM Description of Injury or Disease  Lumbar back pain No   Treatment  Toradol, Valium, Norco  Have you advised the patient to remain off work five days or more?         No   X-Ray Findings    Comments:None taken today.   If Yes   From Date    To Date      From information given by the employee, together with medical evidence, can you directly connect this injury or occupational disease as job  "incurred?  Yes If No, is the employee capable of: Full Duty  No Modified Duty  Yes   Is additional medical care by a physician indicated?  Yes  Comments:We will need to determine if patient requires MRI. If Modified Duty, Specify any Limitations / Restrictions  No lifting over 5 pounds.  Recommend follow-up with occupational health in the next 72 hours to determine length of disability.     Do you know of any previous injury or disease contributing to this condition or occupational disease?  No   Date  1/9/2019 Print Doctor’s Name  Kuldip Adams certify the employer’s copy of this form was mailed on:   Address  [address]  [place] [id number] [phone] Insurer’s Use Only   [facility]  [address]    Provider’s Tax ID Number  [id number] Telephone  Dept: [phone]    Doctor’s Signature  e-MikPEKULDIP SANCHEZ M.D. Degree  MD    Original - TREATING PHYSICIAN OR CHIROPRACTOR   Pg 2-Insurer/TPA   Pg 3-Employer   Pg 4-Employee                                                                                                  Form C-4 (rev01/03)     BRIEF DESCRIPTION OF RIGHTS AND BENEFITS  (Pursuant to [place]RS 616C.050)    Notice of Injury or Occupational Disease (Incident Report Form C-1): If an injury or occupational disease (OD) arises out of and in the course of employment, you must provide written notice to your employer as soon as practicable, but no later than 7 days after the accident or OD. Your employer shall maintain a sufficient supply of the required forms.    Claim for Compensation (Form C-4): If medical treatment is sought, the form C-4 is available at the place of initial treatment. A completed \"Claim for Compensation\" (Form C-4) must be filed within 90 days after an accident or OD. The treating physician or chiropractor must, within 3 working days after treatment, complete and mail to the employer, the employer's insurer and third-party , the Claim for " Compensation.    Medical Treatment: If you require medical treatment for your on-the-job injury or OD, you may be required to select a physician or chiropractor from a list provided by your workers’ compensation insurer, if it has contracted with an Organization for Managed Care (MCO) or Preferred Provider Organization (PPO) or providers of health care. If your employer has not entered into a contract with an MCO or PPO, you may select a physician or chiropractor from the Panel of Physicians and Chiropractors. Any medical costs related to your industrial injury or OD will be paid by your insurer.    Temporary Total Disability (TTD): If your doctor has certified that you are unable to work for a period of at least 5 consecutive days, or 5 cumulative days in a 20-day period, or places restrictions on you that your employer does not accommodate, you may be entitled to TTD compensation.    Temporary Partial Disability (TPD): If the wage you receive upon reemployment is less than the compensation for TTD to which you are entitled, the insurer may be required to pay you TPD compensation to make up the difference. TPD can only be paid for a maximum of 24 months.    Permanent Partial Disability (PPD): When your medical condition is stable and there is an indication of a PPD as a result of your injury or OD, within 30 days, your insurer must arrange for an evaluation by a rating physician or chiropractor to determine the degree of your PPD. The amount of your PPD award depends on the date of injury, the results of the PPD evaluation and your age and wage.    Permanent Total Disability (PTD): If you are medically certified by a treating physician or chiropractor as permanently and totally disabled and have been granted a PTD status by your insurer, you are entitled to receive monthly benefits not to exceed 66 2/3% of your average monthly wage. The amount of your PTD payments is subject to reduction if you previously received  a PPD award.    Vocational Rehabilitation Services: You may be eligible for vocational rehabilitation services if you are unable to return to the job due to a permanent physical impairment or permanent restrictions as a result of your injury or occupational disease.    Transportation and Per Tigist Reimbursement: You may be eligible for travel expenses and per tigist associated with medical treatment.  Reopening: You may be able to reopen your claim if your condition worsens after claim closure.    Appeal Process: If you disagree with a written determination issued by the insurer or the insurer does not respond to your request, you may appeal to the Department of Administration, , by following the instructions contained in your determination letter. You must appeal the determination within 70 days from the date of the determination letter at [address], [address], [place], [place] [address], or [address], [place], [place] [address]. If you disagree with the  decision, you may appeal to the Department of Administration, . You must file your appeal within 30 days from the date of the  decision letter at [address], [address], [place], [place] [address], or [address] [address], [place], [place] [address]. If you disagree with a decision of an , you may file a petition for judicial review with the District Court. You must do so within 30 days of the Appeal Officer’s decision. You may be represented by an  at your own expense or you may contact the [facility] for possible representation.    [place]  for Injured Workers (NAIW): If you disagree with a  decision, you may request that NAIW represent you without charge at an  Hearing. For information regarding denial of benefits, you may contact the [facility] at: [address], [address], [place] [address],  [phone], or [address], [address], [place] [address], [phone]    To File a Complaint with the Division: If you wish to file a complaint with the  of the Division of Industrial Relations (DIR), please contact the Workers’ Compensation Section, [address], [address], [place], [place] [address], telephone ([phone]) [phone], or [address], [address], [place], [place] [address], telephone [phone].    For assistance with Workers’ Compensation Issues: you may contact the Office of the Governor Consumer Health Assistance, [address] [place] [address], [address], [place], [place] [address], Toll Free 1-998.509.3454, Web site: http://[employer].[facility].nv., E-mail [email or web address].                                                                                                                                                                               __________________________________________________________________                                    January 9, 2019            Employee Name / Signature                                                                                                                            Date                                       D-2 (rev. 10/07)

## 2019-01-10 NOTE — PROGRESS NOTES
Patient is stable for discharge at this time, anticipatory guidance provided, close follow-up is encouraged, and strict ED return instructions have been detailed. Patient is agreeable to the disposition and plan  and discharged home in ambulatory state and in good condition.

## 2019-01-11 ENCOUNTER — OCCUPATIONAL MEDICINE (OUTPATIENT)
Dept: URGENT CARE | Facility: CLINIC | Age: 19
End: 2019-01-11
Payer: COMMERCIAL

## 2019-01-11 VITALS
TEMPERATURE: 99 F | HEIGHT: 66 IN | BODY MASS INDEX: 25.39 KG/M2 | RESPIRATION RATE: 18 BRPM | HEART RATE: 97 BPM | OXYGEN SATURATION: 97 % | DIASTOLIC BLOOD PRESSURE: 82 MMHG | SYSTOLIC BLOOD PRESSURE: 120 MMHG | WEIGHT: 158 LBS

## 2019-01-11 DIAGNOSIS — S39.012D LOW BACK STRAIN, SUBSEQUENT ENCOUNTER: ICD-10-CM

## 2019-01-11 PROCEDURE — 99202 OFFICE O/P NEW SF 15 MIN: CPT | Performed by: EMERGENCY MEDICINE

## 2019-01-11 ASSESSMENT — ENCOUNTER SYMPTOMS: NECK PAIN: 0

## 2019-01-11 NOTE — LETTER
[facility] [address] [place] [address]  Phone:  [phone] - Fax:  [phone]   [facility] Progress Report and Disability Certification  Date of Service: 1/11/2019   No Show:  No  Date / Time of Next Visit: 1/16/2019 @ 4pm   Claim Information   Patient Name: Paxton Gómez  Claim Number:     Employer: [employer]  Date of Injury: 1/9/2019     Insurer / TPA: [email or web address]  ID / SSN:     Occupation: ACS  Diagnosis: The encounter diagnosis was Low back strain, subsequent encounter.    Medical Information   Related to Industrial Injury? Yes    Subjective Complaints:  Date of injury: 01/09/2019. Injured at work: yes; onset pain during lifting heavy pan at work. Previous injury: none. Second job: none. Outside activity: none. 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 Severity: moderate, severe. Prior treatment:  Ice/heat, naproxen, Robaxin. Location: Diffuse lower back. Radiation: none. Numbness/tingling: Intermittent left posterior thigh. Focal weakness: none. Bowel/bladder dysfunction: none. Fever: none.   Objective Findings: General: Alert, cooperative, no acute distress.  Musculoskeletal-back: Diffuse lumbar vertebral and paraspinal tenderness, intact range of motion.  No SI joint tenderness.  Straight leg raise negative, Artur negative.  Vascular: Bilateral dorsalis pedis pulses intact.  Skin: Warm, dry, intact.  Neurological: Bilateral lower extremity motor strength intact, symmetrically intact reflexes, grossly symmetric light touch sensation.  Gait normal.   Pre-Existing Condition(s): Prior orthopedist planned MRI; symptoms resolved.   Assessment:   Condition Improved    Status: Additional Care Required  Permanent Disability:No    Plan: Medication    Diagnostics:      Comments:       Disability Information   Status: Released to  Restricted Duty    From:  2019  Through: 2019 Restrictions are: Temporary   Physical Restrictions   Sitting:  < or = to 2 hrs/day Standing:  < or = to 2 hrs/day Stoopin hrs/day Bendin hrs/day   Squattin hrs/day Walking:  < or = to 2 hrs/day Climbin hrs/day Pushin hrs/day   Pullin hrs/day Other:    Reaching Above Shoulder (L):   Reaching Above Shoulder (R):       Reaching Below Shoulder (L):    Reaching Below Shoulder (R):      Not to exceed Weight Limits   Carrying(hrs):   Weight Limit(lb): < or = to 10 pounds Lifting(hrs):   Weight  Limit(lb): < or = to 10 pounds   Comments:      Repetitive Actions   Hands: i.e. Fine Manipulations from Grasping:     Feet: i.e. Operating Foot Controls:     Driving / Operate Machinery:     Physician Name: Laci Gray M.D. Physician Signature:   e-Signature: Dr. Shamar Tinoco, Medical Director   Clinic Name / Location: [address] [address] Clinic Phone Number: Dept: [phone]   Appointment Time: 3:30 Pm Visit Start Time: 3:35 PM   Check-In Time:  3:00 Pm Visit Discharge Time:  4:40pm   Original-Treating Physician or Chiropractor    Page 2-Insurer/TPA    Page 3-Employer    Page 4-Employee

## 2019-01-12 NOTE — PATIENT INSTRUCTIONS
Lumbosacral Strain  Lumbosacral strain is an injury that causes pain in the lower back (lumbosacral spine). This injury usually occurs from overstretching the muscles or ligaments along your spine. A strain can affect one or more muscles or cord-like tissues that connect bones to other bones (ligaments).  What are the causes?  This condition may be caused by:  · A hard, direct hit (blow) to the back.  · Excessive stretching of the lower back muscles. This may result from:  ¨ A fall.  ¨ Lifting something heavy.  ¨ Repetitive movements such as bending or crouching.  What increases the risk?  The following factors may increase your risk of getting this condition:  · Participating in sports or activities that involve:  ¨ A sudden twist of the back.  ¨ Pushing or pulling motions.  · Being overweight or obese.  · Having poor strength and flexibility, especially tight hamstrings or weak muscles in the back or abdomen.  · Having too much of a curve in the lower back.  · Having a pelvis that is tilted forward.  What are the signs or symptoms?  The main symptom of this condition is pain in the lower back, at the site of the strain. Pain may extend (radiate) down one or both legs.  How is this diagnosed?  This condition is diagnosed based on:  · Your symptoms.  · Your medical history.  · A physical exam.  ¨ Your health care provider may push on certain areas of your back to determine the source of your pain.  ¨ You may be asked to bend forward, backward, and side to side to assess the severity of your pain and your range of motion.  · Imaging tests, such as:  ¨ X-rays.  ¨ MRI.  How is this treated?  Treatment for this condition may include:  · Putting heat and cold on the affected area.  · Medicines to help relieve pain and relax your muscles (muscle relaxants).  · NSAIDs to help reduce swelling and discomfort.  When your symptoms improve, it is important to gradually return to your normal routine as soon as possible to reduce  pain, avoid stiffness, and avoid loss of muscle strength. Generally, symptoms should improve within 6 weeks of treatment. However, recovery time varies.  Follow these instructions at home:  Managing pain, stiffness, and swelling  · If directed, put ice on the injured area during the first 24 hours after your strain.  ¨ Put ice in a plastic bag.  ¨ Place a towel between your skin and the bag.  ¨ Leave the ice on for 20 minutes, 2-3 times a day.  · If directed, put heat on the affected area as often as told by your health care provider. Use the heat source that your health care provider recommends, such as a moist heat pack or a heating pad.  ¨ Place a towel between your skin and the heat source.  ¨ Leave the heat on for 20-30 minutes.  ¨ Remove the heat if your skin turns bright red. This is especially important if you are unable to feel pain, heat, or cold. You may have a greater risk of getting burned.  Activity  · Rest and return to your normal activities as told by your health care provider. Ask your health care provider what activities are safe for you.  · Avoid activities that take a lot of energy for as long as told by your health care provider.  General instructions  · Take over-the-counter and prescription medicines only as told by your health care provider.  · Do not drive or use heavy machinery while taking prescription pain medicine.  · Do not use any products that contain nicotine or tobacco, such as cigarettes and e-cigarettes. If you need help quitting, ask your health care provider.  · Keep all follow-up visits as told by your health care provider. This is important.  How is this prevented?  · Use correct form when playing sports and lifting heavy objects.  · Use good posture when sitting and standing.  · Maintain a healthy weight.  · Sleep on a mattress with medium firmness to support your back.  · Be safe and responsible while being active to avoid falls.  · Do at least 150 minutes of  moderate-intensity exercise each week, such as brisk walking or water aerobics. Try a form of exercise that takes stress off your back, such as swimming or stationary cycling.  · Maintain physical fitness, including:  ¨ Strength.  ¨ Flexibility.  ¨ Cardiovascular fitness.  ¨ Endurance.  Contact a health care provider if:  · Your back pain does not improve after 6 weeks of treatment.  · Your symptoms get worse.  Get help right away if:  · Your back pain is severe.  · You cannot stand or walk.  · You have difficulty controlling when you urinate or when you have a bowel movement.  · You feel nauseous or you vomit.  · Your feet get very cold.  · You have numbness, tingling, weakness, or problems using your arms or legs.  · You develop any of the following:  ¨ Shortness of breath.  ¨ Dizziness.  ¨ Pain in your legs.  ¨ Weakness in your buttocks or legs.  ¨ Discoloration of the skin on your toes or legs.  This information is not intended to replace advice given to you by your health care provider. Make sure you discuss any questions you have with your health care provider.  Document Released: 09/27/2006 Document Revised: 07/07/2017 Document Reviewed: 05/21/2017  [employer] Interactive Patient Education © 2017 [employer]

## 2019-01-12 NOTE — PROGRESS NOTES
Subjective:      Paxton Gómez is a 18 y.o. male who presents with Back Injury (he went to the ER for what happen he is here to have MRI referal, back and sacrum injury)      Date of injury: 01/09/2019. Injured at work: yes; onset pain during lifting heavy pan at work. Previous injury: none. Second job: none. Outside activity: none. 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 Severity: moderate, severe. Prior treatment:  Ice/heat, naproxen, Robaxin. Location: Diffuse lower back. Radiation: none. Numbness/tingling: Intermittent left posterior thigh. Focal weakness: none. Bowel/bladder dysfunction: none. Fever: none.     HPI    Review of Systems   Musculoskeletal: Negative for neck pain.       PMH:  has a past medical history of Left sided sciatica (6/25/2018); NEGATIVE HISTORY OF; and Plantar wart (6/27/2018). He also has no past medical history of Asthma; Cancer ([facility]); Diabetes ([facility]); Hyperlipidemia; or Hypertension.  MEDS:   Current Outpatient Prescriptions:   •  naproxen (NAPROSYN) 500 MG Tab, Take 1 Tab by mouth 2 times a day, with meals., Disp: 30 Tab, Rfl: 0  •  ibuprofen (MOTRIN) 200 MG Tab, Take 600 mg by mouth every 6 hours as needed for Headache., Disp: , Rfl:   •  amoxicillin (AMOXIL) 500 MG Cap, Take 500 mg by mouth 4 times a day. 7 day course, Disp: , Rfl:   •  methocarbamol (ROBAXIN) 750 MG Tab, Take 2 Tabs by mouth 3 times a day. (Patient not taking: Reported on 1/11/2019), Disp: 45 Tab, Rfl: 0  ALLERGIES:   Allergies   Allergen Reactions   • Cyclobenzaprine Swelling     Pt reports that it made his face swell     SURGHX: History reviewed. No pertinent surgical history.  SOCHX:  reports that he has never smoked. He has never used smokeless tobacco. He reports that he does not drink alcohol or use drugs.  FH: family history includes Diabetes in his father, maternal grandfather, maternal grandmother,  "paternal grandfather, and paternal grandmother.     Objective:     /82   Pulse 97   Temp 37.2 °C (99 °F)   Resp 18   Ht 1.676 m (5' 6\")   Wt 71.7 kg (158 lb)   SpO2 97%   BMI 25.50 kg/m²      Physical Exam    General: Alert, cooperative, no acute distress.  Musculoskeletal-back: Diffuse lumbar vertebral and paraspinal tenderness, intact range of motion.  No SI joint tenderness.  Straight leg raise negative, Artur negative.  Vascular: Bilateral dorsalis pedis pulses intact.  Skin: Warm, dry, intact.  Neurological: Bilateral lower extremity motor strength intact, symmetrically intact reflexes, grossly symmetric light touch sensation.  Gait normal.       Assessment/Plan:     1. Low back strain, subsequent encounter  D39 completed.  Continue ice/heat.  Continue naproxen; add Tylenol PRN.  Use Robaxin as needed for nighttime.      "

## 2019-01-16 ENCOUNTER — OCCUPATIONAL MEDICINE (OUTPATIENT)
Dept: URGENT CARE | Facility: CLINIC | Age: 19
End: 2019-01-16
Payer: COMMERCIAL

## 2019-01-16 VITALS
SYSTOLIC BLOOD PRESSURE: 110 MMHG | HEIGHT: 66 IN | WEIGHT: 158 LBS | RESPIRATION RATE: 16 BRPM | OXYGEN SATURATION: 94 % | TEMPERATURE: 98.6 F | BODY MASS INDEX: 25.39 KG/M2 | HEART RATE: 83 BPM | DIASTOLIC BLOOD PRESSURE: 74 MMHG

## 2019-01-16 DIAGNOSIS — S39.012D ACUTE MYOFASCIAL STRAIN OF LUMBOSACRAL REGION, SUBSEQUENT ENCOUNTER: Primary | ICD-10-CM

## 2019-01-16 PROCEDURE — 99214 OFFICE O/P EST MOD 30 MIN: CPT | Mod: 29 | Performed by: PHYSICIAN ASSISTANT

## 2019-01-16 RX ORDER — METAXALONE 800 MG/1
800 TABLET ORAL 3 TIMES DAILY
Qty: 21 TAB | Refills: 0 | Status: SHIPPED | OUTPATIENT
Start: 2019-01-16 | End: 2019-01-16 | Stop reason: CLARIF

## 2019-01-16 RX ORDER — METHYLPREDNISOLONE 4 MG/1
TABLET ORAL
Qty: 21 TAB | Refills: 0 | Status: SHIPPED | OUTPATIENT
Start: 2019-01-16 | End: 2023-07-19

## 2019-01-16 NOTE — LETTER
[facility] [place] [address] [facility] [address] [place] [address]  Phone:  [phone] - Fax:  [phone]   [facility] Progress Report and Disability Certification  Date of Service: 1/16/2019   No Show:  No  Date / Time of Next Visit: 1/21/19 @3:45 PM   Claim Information   Patient Name: Paxton Gómez  Claim Number:     Employer: [employer]  Date of Injury: 1/9/2019     Insurer / TPA: [employer]  ID / SSN:     Occupation: ACS  Diagnosis: The encounter diagnosis was Acute myofascial strain of lumbosacral region, subsequent encounter.    Medical Information   Related to Industrial Injury? Yes    Subjective Complaints:  Date of injury: 01/09/2019. Pt is here for his 2nd follow up visit from the ER. Pt notes no improvement.  Injured at work: yes; onset pain during lifting heavy pan at work. Previous injury: none. Second job: none. Outside activity: none. 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 Severity: moderate, severe. Prior treatment:  Ice/heat, naproxen, Robaxin. Location: Diffuse lower back. Radiation: none. Numbness/tingling: Intermittent left posterior thigh. Focal weakness: none. Bowel/bladder dysfunction: none. Fever: none. Pt has not taken any Rx medications for this condition. Pt states the pain is a 7-8/10, aching in nature and worse at night. Pt denies CP, SOB, NVD, paresthesias, headaches, dizziness, change in vision, hives, or other joint pain. The pt's medication list, problem list, and allergies have been evaluated and reviewed during today's visit.     Objective Findings: Lumbar spine: Upon inspection, no ecchymoses, no edema, no erythema. +TTP about paraspinal muscles, BLE: +AROM, +SILT, STR 5/5, DP 2+ B/L      Pre-Existing Condition(s):     Assessment:   Condition Same    Status: Additional Care Required  Permanent Disability:No    Plan:  Medication  Comments:RX medrol pack RX skelaxin (not at work)    Diagnostics:      Comments:       Disability Information   Status: Released to Restricted Duty    From:  2019  Through: 2019 Restrictions are: Temporary   Physical Restrictions   Sitting:  < or = to 2 hrs/day Standing:  < or = to 2 hrs/day Stoopin hrs/day Bendin hrs/day   Squattin hrs/day Walking:  < or = to 2 hrs/day Climbin hrs/day Pushin hrs/day   Pullin hrs/day Other:    Reaching Above Shoulder (L):   Reaching Above Shoulder (R):       Reaching Below Shoulder (L):    Reaching Below Shoulder (R):      Not to exceed Weight Limits   Carrying(hrs):   Weight Limit(lb): < or = to 10 pounds Lifting(hrs):   Weight  Limit(lb): < or = to 10 pounds   Comments:      Repetitive Actions   Hands: i.e. Fine Manipulations from Grasping:     Feet: i.e. Operating Foot Controls:     Driving / Operate Machinery:     Physician Name: Tara Naidu P.A.-C. Physician Signature: TARA Phelps P.A.-C. e-Signature: Dr. Shamar Tinoco, Medical Director   Clinic Name / Location: [address] [place] [address] Clinic Phone Number: Dept: [phone]   Appointment Time: 4:00 Pm Visit Start Time: 5:08 PM   Check-In Time:  4:54 Pm Visit Discharge Time:  5:42 PM   Original-Treating Physician or Chiropractor    Page 2-Insurer/TPA    Page 3-Employer    Page 4-Employee

## 2019-01-17 NOTE — PROGRESS NOTES
Subjective:      Pt is a 18 y.o. male who presents with Follow-Up (back pain. just the same as last week. this happen at work he got bent over from a cage)      Date of injury: 01/09/2019. Pt is here for his 2nd follow up visit from the ER. Pt notes no improvement.  Injured at work: yes; onset pain during lifting heavy pan at work. Previous injury: none. Second job: none. Outside activity: none. 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 Severity: moderate, severe. Prior treatment:  Ice/heat, naproxen, Robaxin. Location: Diffuse lower back. Radiation: none. Numbness/tingling: Intermittent left posterior thigh. Focal weakness: none. Bowel/bladder dysfunction: none. Fever: none. Pt has not taken any Rx medications for this condition. Pt states the pain is a 7-8/10, aching in nature and worse at night. Pt denies CP, SOB, NVD, paresthesias, headaches, dizziness, change in vision, hives, or other joint pain. The pt's medication list, problem list, and allergies have been evaluated and reviewed during today's visit.       HPI  PMH:  Past Medical History:   Diagnosis Date   • Left sided sciatica 6/25/2018   • NEGATIVE HISTORY OF    • Plantar wart 6/27/2018       PSH:  Negative per pt.      Fam Hx:    family history includes Diabetes in his father, maternal grandfather, maternal grandmother, paternal grandfather, and paternal grandmother.  Family Status   Relation Status   • Mo Alive   • Fa Alive   • MGMo (Not Specified)   • MGFa (Not Specified)   • PGMo (Not Specified)   • PGFa (Not Specified)   • Neg Hx (Not Specified)       Soc HX:  Social History     Social History   • Marital status: Single     Spouse name: N/A   • Number of children: N/A   • Years of education: N/A     Occupational History   • Not on file.     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     Will be playing soccer.     Gets good grades, mostly A's and B's.                  Medications:    Current Outpatient Prescriptions:   •  MethylPREDNISolone (MEDROL DOSEPAK) 4 MG Tablet Therapy Pack, Use as directed, Disp: 21 Tab, Rfl: 0  •  naproxen (NAPROSYN) 500 MG Tab, Take 1 Tab by mouth 2 times a day, with meals., Disp: 30 Tab, Rfl: 0  •  ibuprofen (MOTRIN) 200 MG Tab, Take 600 mg by mouth every 6 hours as needed for Headache., Disp: , Rfl:   •  amoxicillin (AMOXIL) 500 MG Cap, Take 500 mg by mouth 4 times a day. 7 day course, Disp: , Rfl:       Allergies:  Cyclobenzaprine    ROS  Constitutional: Negative for fever, chills and malaise/fatigue.   HENT: Negative for congestion and sore throat.    Eyes: Negative for blurred vision, double vision and photophobia.   Respiratory: Negative for cough and shortness of breath.  Cardiovascular: Negative for chest pain and palpitations.   Gastrointestinal: Negative for heartburn, nausea, vomiting, abdominal pain, diarrhea and constipation.   Genitourinary: Negative for dysuria and flank pain.   Musculoskeletal: POS for lumbar joint pain and myalgias.   Skin: Negative for itching and rash.   Neurological: Negative for dizziness, tingling and headaches.   Endo/Heme/Allergies: Does not bruise/bleed easily.   Psychiatric/Behavioral: Negative for depression. The patient is not nervous/anxious.           Objective:     /74   Pulse 83   Temp 37 °C (98.6 °F)   Resp 16   Ht 1.676 m (5' 6\")   Wt 71.7 kg (158 lb)   SpO2 94%   BMI 25.50 kg/m²      Physical Exam    Lumbar spine: Upon inspection, no ecchymoses, no edema, no erythema. +TTP about paraspinal muscles, BLE: +AROM, +SILT, STR 5/5, DP 2+ B/L     Constitutional: PT is oriented to person, place, and time. PT appears well-developed and well-nourished. No " distress.   HENT:   Head: Normocephalic and atraumatic.   Mouth/Throat: Oropharynx is clear and moist. No oropharyngeal exudate.   Eyes: Conjunctivae normal and EOM are normal. Pupils are equal, round, and reactive to light.   Neck: Normal range of motion. Neck supple. No thyromegaly present.   Cardiovascular: Normal rate, regular rhythm, normal heart sounds and intact distal pulses.  Exam reveals no gallop and no friction rub.    No murmur heard.  Pulmonary/Chest: Effort normal and breath sounds normal. No respiratory distress. PT has no wheezes. PT has no rales. Pt exhibits no tenderness.   Abdominal: Soft. Bowel sounds are normal. PT exhibits no distension and no mass. There is no tenderness. There is no rebound and no guarding.   Neurological: PT is alert and oriented to person, place, and time. PT has normal reflexes. No cranial nerve deficit.   Skin: Skin is warm and dry. No rash noted. PT is not diaphoretic. No erythema.       Psychiatric: PT has a normal mood and affect. PT behavior is normal. Judgment and thought content normal.        Assessment/Plan:     1. Acute myofascial strain of lumbosacral region, subsequent encounter    - MethylPREDNISolone (MEDROL DOSEPAK) 4 MG Tablet Therapy Pack; Use as directed  Dispense: 21 Tab; Refill: 0  - Flexeril    Pt to stop using Robaxin as he notes it is not helping him and states pain is worsening and now is having difficulty with walking and weightbearing due to the pain in his lower back which he notes is at a 7-8/10 even with OTC ibuprofen which hinders his ADLs.  Gentle ROM exercises discussed  WB with restrictions  Ice/heat therapy discussed  OTC ibuprofen for pain control  Rest, fluids encouraged.  AVS with medical info given.  Pt was in full understanding and agreement with the plan.  Follow-up 7 days for next WC appt

## 2019-01-17 NOTE — PATIENT INSTRUCTIONS
Lumbosacral Strain  Lumbosacral strain is an injury that causes pain in the lower back (lumbosacral spine). This injury usually occurs from overstretching the muscles or ligaments along your spine. A strain can affect one or more muscles or cord-like tissues that connect bones to other bones (ligaments).  What are the causes?  This condition may be caused by:  · A hard, direct hit (blow) to the back.  · Excessive stretching of the lower back muscles. This may result from:  ¨ A fall.  ¨ Lifting something heavy.  ¨ Repetitive movements such as bending or crouching.  What increases the risk?  The following factors may increase your risk of getting this condition:  · Participating in sports or activities that involve:  ¨ A sudden twist of the back.  ¨ Pushing or pulling motions.  · Being overweight or obese.  · Having poor strength and flexibility, especially tight hamstrings or weak muscles in the back or abdomen.  · Having too much of a curve in the lower back.  · Having a pelvis that is tilted forward.  What are the signs or symptoms?  The main symptom of this condition is pain in the lower back, at the site of the strain. Pain may extend (radiate) down one or both legs.  How is this diagnosed?  This condition is diagnosed based on:  · Your symptoms.  · Your medical history.  · A physical exam.  ¨ Your health care provider may push on certain areas of your back to determine the source of your pain.  ¨ You may be asked to bend forward, backward, and side to side to assess the severity of your pain and your range of motion.  · Imaging tests, such as:  ¨ X-rays.  ¨ MRI.  How is this treated?  Treatment for this condition may include:  · Putting heat and cold on the affected area.  · Medicines to help relieve pain and relax your muscles (muscle relaxants).  · NSAIDs to help reduce swelling and discomfort.  When your symptoms improve, it is important to gradually return to your normal routine as soon as possible to reduce  pain, avoid stiffness, and avoid loss of muscle strength. Generally, symptoms should improve within 6 weeks of treatment. However, recovery time varies.  Follow these instructions at home:  Managing pain, stiffness, and swelling  · If directed, put ice on the injured area during the first 24 hours after your strain.  ¨ Put ice in a plastic bag.  ¨ Place a towel between your skin and the bag.  ¨ Leave the ice on for 20 minutes, 2-3 times a day.  · If directed, put heat on the affected area as often as told by your health care provider. Use the heat source that your health care provider recommends, such as a moist heat pack or a heating pad.  ¨ Place a towel between your skin and the heat source.  ¨ Leave the heat on for 20-30 minutes.  ¨ Remove the heat if your skin turns bright red. This is especially important if you are unable to feel pain, heat, or cold. You may have a greater risk of getting burned.  Activity  · Rest and return to your normal activities as told by your health care provider. Ask your health care provider what activities are safe for you.  · Avoid activities that take a lot of energy for as long as told by your health care provider.  General instructions  · Take over-the-counter and prescription medicines only as told by your health care provider.  · Do not drive or use heavy machinery while taking prescription pain medicine.  · Do not use any products that contain nicotine or tobacco, such as cigarettes and e-cigarettes. If you need help quitting, ask your health care provider.  · Keep all follow-up visits as told by your health care provider. This is important.  How is this prevented?  · Use correct form when playing sports and lifting heavy objects.  · Use good posture when sitting and standing.  · Maintain a healthy weight.  · Sleep on a mattress with medium firmness to support your back.  · Be safe and responsible while being active to avoid falls.  · Do at least 150 minutes of  moderate-intensity exercise each week, such as brisk walking or water aerobics. Try a form of exercise that takes stress off your back, such as swimming or stationary cycling.  · Maintain physical fitness, including:  ¨ Strength.  ¨ Flexibility.  ¨ Cardiovascular fitness.  ¨ Endurance.  Contact a health care provider if:  · Your back pain does not improve after 6 weeks of treatment.  · Your symptoms get worse.  Get help right away if:  · Your back pain is severe.  · You cannot stand or walk.  · You have difficulty controlling when you urinate or when you have a bowel movement.  · You feel nauseous or you vomit.  · Your feet get very cold.  · You have numbness, tingling, weakness, or problems using your arms or legs.  · You develop any of the following:  ¨ Shortness of breath.  ¨ Dizziness.  ¨ Pain in your legs.  ¨ Weakness in your buttocks or legs.  ¨ Discoloration of the skin on your toes or legs.  This information is not intended to replace advice given to you by your health care provider. Make sure you discuss any questions you have with your health care provider.  Document Released: 09/27/2006 Document Revised: 07/07/2017 Document Reviewed: 05/21/2017  Tk20 Interactive Patient Education © 2017 [employer]

## 2019-01-21 ENCOUNTER — OCCUPATIONAL MEDICINE (OUTPATIENT)
Dept: OCCUPATIONAL MEDICINE | Facility: CLINIC | Age: 19
End: 2019-01-21
Payer: COMMERCIAL

## 2019-01-21 VITALS
RESPIRATION RATE: 14 BRPM | SYSTOLIC BLOOD PRESSURE: 122 MMHG | HEART RATE: 97 BPM | BODY MASS INDEX: 25.39 KG/M2 | HEIGHT: 66 IN | TEMPERATURE: 98.3 F | WEIGHT: 158 LBS | DIASTOLIC BLOOD PRESSURE: 80 MMHG | OXYGEN SATURATION: 98 %

## 2019-01-21 DIAGNOSIS — S39.012D STRAIN OF LUMBAR REGION, SUBSEQUENT ENCOUNTER: ICD-10-CM

## 2019-01-21 PROCEDURE — 99204 OFFICE O/P NEW MOD 45 MIN: CPT | Performed by: PREVENTIVE MEDICINE

## 2019-01-21 RX ORDER — DICLOFENAC SODIUM 75 MG/1
75 TABLET, DELAYED RELEASE ORAL 2 TIMES DAILY
Qty: 60 TAB | Refills: 0 | Status: SHIPPED | OUTPATIENT
Start: 2019-01-21 | End: 2023-07-19

## 2019-01-21 NOTE — LETTER
[address],   [address] [place] [address]  Phone:  [phone] - Fax:  [phone]   [facility] Progress Report and Disability Certification  Date of Service: 1/21/2019   No Show:  No  Date / Time of Next Visit: 2/12/2019 @ 0400pm   Claim Information   Patient Name: Paxton Gómez  Claim Number:     Employer: [employer]  Date of Injury: 1/9/2019     Insurer / TPA: [employer]  ID / SSN:     Occupation: ACS  Diagnosis: The encounter diagnosis was Strain of lumbar region, subsequent encounter.    Medical Information   Related to Industrial Injury? Yes    Subjective Complaints:  DOI 1/9/2019: 17 yo male presents with lower back pain.  He was pulling something out of the cage and felt sharp pull in the lower back.  He was seen in the emergency department, in urgent care.  He has tried Medrol Dosepak, naproxen, Skelaxin and Robaxin all with only some relief.  Patient states able to walk a little bit better but still has difficulty bending and twisting.  He states he finished the Medrol Dosepak and is currently not taking other medications.  He notes some rating pain in the left leg on the side but that was relatively recent.  Pain is mostly located in the low mid lower spine.   Objective Findings: Lumbar: No gross deformity.  Tenderness over the sciatic notch bilaterally.  Flexion decreased to less than 30 degrees, somewhat limited rotation.  Straight leg test negative.  Reflexes intact.  Antalgic gait.   Pre-Existing Condition(s): Had some sciatic pain for several months about a year ago, cleared up approximately 6 months ago he was seeing primary care physician for that..   Assessment:   Condition Same    Status: Additional Care Required  Permanent Disability:No    Plan:      Diagnostics:      Comments:  Referral to physical therapy  Prescribed diclofenac  May use muscle relaxants at night as needed  Restricted  duty  Follow-up 3 weeks    Disability Information   Status: Released to Restricted Duty    From:  1/21/2019  Through: 2/12/2019 Restrictions are: Temporary   Physical Restrictions   Sitting:    Standing:  < or = to 2 hrs/day Stooping:  < or = to 1 hr/day Bending:  < or = to 1 hr/day   Squatting:    Walking:  < or = to 2 hrs/day Climbing:    Pushing:  < or = to 2 hrs/day   Pulling:  < or = to 2 hrs/day Other:    Reaching Above Shoulder (L):   Reaching Above Shoulder (R):       Reaching Below Shoulder (L):    Reaching Below Shoulder (R):      Not to exceed Weight Limits   Carrying(hrs):   Weight Limit(lb): < or = to 10 pounds Lifting(hrs):   Weight  Limit(lb): < or = to 10 pounds   Comments: Limit push/pull/lift to less than 10 lbs    Repetitive Actions   Hands: i.e. Fine Manipulations from Grasping:     Feet: i.e. Operating Foot Controls:     Driving / Operate Machinery:     Physician Name: Angel Ludwig D.O. Physician Signature: ANGEL Rock D.O. e-Signature: Dr. Shamar Tinoco, Medical Director   Clinic Name / Location: [address],   [address] [place] [address] Clinic Phone Number: Dept: [phone]   Appointment Time: 3:45 Pm Visit Start Time: 3:41 PM   Check-In Time:  3:40 Pm Visit Discharge Time:  0410pm   Original-Treating Physician or Chiropractor    Page 2-Insurer/TPA    Page 3-Employer    Page 4-Employee

## 2019-01-21 NOTE — PROGRESS NOTES
"Subjective:      Paxton Gómez is a 18 y.o. male who presents with Other ([facility] DOI 01-10-19 LUMBAR SPINE-better- rm 16)      DOI 1/9/2019: 17 yo male presents with lower back pain.  He was pulling something out of the cage and felt sharp pull in the lower back.  He was seen in the emergency department, in urgent care.  He has tried Medrol Dosepak, naproxen, Skelaxin and Robaxin all with only some relief.  Patient states able to walk a little bit better but still has difficulty bending and twisting.  He states he finished the Medrol Dosepak and is currently not taking other medications.  He notes some rating pain in the left leg on the side but that was relatively recent.  Pain is mostly located in the low mid lower spine.     HPI    ROS  ROS: All systems were reviewed on intake form, form was reviewed and signed. See scanned documents in media. Pertinent positives and negatives included in HPI.    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 He appears well-developed and well-nourished.   HENT:   Right Ear: External ear normal.   Left Ear: External ear normal.   Eyes: Conjunctivae and EOM are normal.   Cardiovascular: Normal rate.    Pulmonary/Chest: Effort normal.   Neurological: He is alert and oriented to person, place, and time.   Skin: Skin is warm and dry.   Psychiatric: He has a normal mood and affect. Judgment normal.       Lumbar: No gross deformity.  Tenderness over the sciatic notch bilaterally.  " Flexion decreased to less than 30 degrees, somewhat limited rotation.  Straight leg test negative.  Reflexes intact.  Antalgic gait.       Assessment/Plan:     1. Strain of lumbar region, subsequent encounter  - REFERRAL TO PHYSICAL THERAPY Reason for Therapy: Eval/Treat/Report  - diclofenac EC (VOLTAREN) 75 MG Tablet Delayed Response; Take 1 Tab by mouth 2 times a day.  Dispense: 60 Tab; Refill: 0    Referral to physical therapy  Prescribed diclofenac  May use muscle relaxants at night as needed  Restricted duty  Follow-up 3 weeks

## 2019-02-12 ENCOUNTER — OCCUPATIONAL MEDICINE (OUTPATIENT)
Dept: OCCUPATIONAL MEDICINE | Facility: CLINIC | Age: 19
End: 2019-02-12
Payer: COMMERCIAL

## 2019-02-12 VITALS
HEART RATE: 83 BPM | TEMPERATURE: 99.4 F | SYSTOLIC BLOOD PRESSURE: 114 MMHG | HEIGHT: 66 IN | DIASTOLIC BLOOD PRESSURE: 70 MMHG | BODY MASS INDEX: 25.39 KG/M2 | WEIGHT: 158 LBS | OXYGEN SATURATION: 92 %

## 2019-02-12 DIAGNOSIS — S39.012D STRAIN OF LUMBAR REGION, SUBSEQUENT ENCOUNTER: ICD-10-CM

## 2019-02-12 PROCEDURE — 99212 OFFICE O/P EST SF 10 MIN: CPT | Performed by: PREVENTIVE MEDICINE

## 2019-02-12 NOTE — LETTER
[address],   [address] [place] [address]  Phone:  [phone] - Fax:  [phone]   [facility] Progress Report and Disability Certification  Date of Service: 2/12/2019   No Show:  No  Date / Time of Next Visit: 3/5/2019   Claim Information   Patient Name: Paxton Gómez  Claim Number:     Employer: [employer]  Date of Injury: 1/9/2019     Insurer / TPA: [employer]  ID / SSN:     Occupation: ACS  Diagnosis: The encounter diagnosis was Strain of lumbar region, subsequent encounter.    Medical Information   Related to Industrial Injury? Yes    Subjective Complaints:  DOI 1/9/2019: 19 yo male presents with lower back pain.  He was pulling something out of the cage and felt sharp pull in the lower back.  Patient states overall he feels good improvement.  He states pain is mostly improved.  He states with bending he still notes some pain on the right side.  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  Physical therapy has been helping.  Patient states he is attended around 6 visits.   Objective Findings: Lumbar: No gross deformity.  Full range of motion with some pain at the end of flexion.  No tenderness.  Normal gait.   Pre-Existing Condition(s):     Assessment:   Condition Improved    Status: Additional Care Required  Permanent Disability:No    Plan:      Diagnostics:      Comments:  Continue physical therapy, referred for more visits  Restricted duty  Follow-up 3 weeks    Disability Information   Status: Released to Restricted Duty    From:  2/12/2019  Through: 3/5/2019 Restrictions are: Temporary   Physical Restrictions   Sitting:    Standing:    Stooping:    Bending:      Squatting:    Walking:    Climbing:    Pushing:      Pulling:    Other:    Reaching Above Shoulder (L):   Reaching Above Shoulder (R):       Reaching Below Shoulder (L):    Reaching Below  Shoulder (R):      Not to exceed Weight Limits   Carrying(hrs):   Weight Limit(lb): < or = to 50 pounds Lifting(hrs):   Weight  Limit(lb): < or = to 50 pounds   Comments: Limit push/pull/lift to less than 50 lbs    Repetitive Actions   Hands: i.e. Fine Manipulations from Grasping:     Feet: i.e. Operating Foot Controls:     Driving / Operate Machinery:     Physician Name: Angel Ludwig D.O. Physician Signature: ANGEL Rock D.O. e-Signature: Dr. Shamar Tinoco, Medical Director   Clinic Name / Location: [address],   [address]  [place] [address] Clinic Phone Number: Dept: [phone]   Appointment Time: 4:00 Pm Visit Start Time: 3:56 PM   Check-In Time:  3:52 Pm Visit Discharge Time: 4:25 PM   Original-Treating Physician or Chiropractor    Page 2-Insurer/TPA    Page 3-Employer    Page 4-Employee

## 2019-02-13 NOTE — PROGRESS NOTES
"Subjective:      Paxton Gómez is a 19 y.o. male who presents with Follow-Up (Doi 01/09/19- L back - better )      DOI 1/9/2019: 19 yo male presents with lower back pain.  He was pulling something out of the cage and felt sharp pull in the lower back.  Patient states overall he feels good improvement.  He states pain is mostly improved.  He states with bending he still notes some pain on the right side.  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  Physical therapy has been helping.  Patient states he is attended around 6 visits.     HPI    ROS       Objective:     /70   Pulse 83   Temp 37.4 °C (99.4 °F)   Ht 1.676 m (5' 6\")   Wt 71.7 kg (158 lb)   SpO2 92%   BMI 25.50 kg/m²      Physical Exam    Lumbar: No gross deformity.  Full range of motion with some pain at the end of flexion.  No tenderness.  Normal gait.       Assessment/Plan:     1. Strain of lumbar region, subsequent encounter  - REFERRAL TO PHYSICAL THERAPY Reason for Therapy: Eval/Treat/Report    Continue physical therapy, referred for more visits  Restricted duty  Follow-up 3 weeks  "

## 2019-03-07 ENCOUNTER — TELEPHONE (OUTPATIENT)
Dept: OCCUPATIONAL MEDICINE | Facility: CLINIC | Age: 19
End: 2019-03-07

## 2019-03-12 ENCOUNTER — OCCUPATIONAL MEDICINE (OUTPATIENT)
Dept: OCCUPATIONAL MEDICINE | Facility: CLINIC | Age: 19
End: 2019-03-12
Payer: COMMERCIAL

## 2019-03-12 VITALS
HEART RATE: 78 BPM | RESPIRATION RATE: 16 BRPM | DIASTOLIC BLOOD PRESSURE: 74 MMHG | BODY MASS INDEX: 25.39 KG/M2 | WEIGHT: 158 LBS | HEIGHT: 66 IN | SYSTOLIC BLOOD PRESSURE: 110 MMHG | TEMPERATURE: 97.9 F | OXYGEN SATURATION: 99 %

## 2019-03-12 DIAGNOSIS — S39.012D STRAIN OF LUMBAR REGION, SUBSEQUENT ENCOUNTER: ICD-10-CM

## 2019-03-12 PROCEDURE — 99213 OFFICE O/P EST LOW 20 MIN: CPT | Performed by: PREVENTIVE MEDICINE

## 2019-03-12 ASSESSMENT — ENCOUNTER SYMPTOMS
SENSORY CHANGE: 0
TINGLING: 1

## 2019-03-12 NOTE — PROGRESS NOTES
"Subjective:      Paxton Gómez is a 19 y.o. male who presents with Other ([facility] DOI 1/9/2019 back, same, rm 17)      DOI 1/9/2019: 17 yo male presents with lower back pain.  He was pulling something out of the cage and felt sharp pull in the lower back.  Patient states overall symptoms are the same as last visit.  Its that with squatting and twisting his like him pain but with bending he does get pain.  He is noted some pain/tingling going to the left inner thigh into the mid back.  Patient has been attending physical therapy and initially had some improvement but has not had much in the last week or 2.     HPI    Review of Systems   Neurological: Positive for tingling. Negative for sensory change.     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     Objective:     /74   Pulse 78   Temp 36.6 °C (97.9 °F)   Resp 16   Ht 1.676 m (5' 6\")   Wt 71.7 kg (158 lb)   SpO2 99%   BMI 25.50 kg/m²      Physical Exam   Constitutional: He is oriented to person, place, and time. He appears well-developed and well-nourished.   Cardiovascular: Normal rate.    Pulmonary/Chest: Effort normal.   Neurological: He is alert and oriented to person, place, and time.   Skin: Skin is warm and dry.   Psychiatric: He has a normal mood and affect. Judgment normal.       Lumbar: No gross deformity.  Full range of motion with some pain at the end of flexion.  No tenderness.  Straight leg test negative.  Normal gait.       Assessment/Plan:     1. Strain of lumbar region, subsequent encounter  Referral for MRI Lumbar w/o given prolonged symptoms  Referral for more PT  Continue restricted duty  Follow up 3 weeks      "

## 2019-03-12 NOTE — LETTER
[address],   [address] [place] [address]  Phone:  [phone] - Fax:  [phone]   [facility] Progress Report and Disability Certification  Date of Service: 3/12/2019   No Show:  No  Date / Time of Next Visit: 4/4/2019 @ 3:45 PM   Claim Information   Patient Name: Paxton Gómez  Claim Number:     Employer: [employer]  Date of Injury: 1/9/2019     Insurer / TPA: [employer]  ID / SSN:     Occupation: ACS  Diagnosis: The encounter diagnosis was Strain of lumbar region, subsequent encounter.    Medical Information   Related to Industrial Injury? Yes    Subjective Complaints:  DOI 1/9/2019: 19 yo male presents with lower back pain.  He was pulling something out of the cage and felt sharp pull in the lower back.  Patient states overall symptoms are the same as last visit.  Its that with squatting and twisting his like him pain but with bending he does get pain.  He is noted some pain/tingling going to the left inner thigh into the mid back.  Patient has been attending physical therapy and initially had some improvement but has not had much in the last week or 2.   Objective Findings: Lumbar: No gross deformity.  Full range of motion with some pain at the end of flexion.  No tenderness.  Straight leg test negative.  Normal gait.   Pre-Existing Condition(s):     Assessment:   Condition Same    Status: Additional Care Required  Permanent Disability:No    Plan:      Diagnostics:      Comments:  Referral for MRI Lumbar w/o given prolonged symptoms  Referral for more PT  Continue restricted duty  Follow up 3 weeks    Disability Information   Status: Released to Restricted Duty    From:  3/12/2019  Through: 4/4/2019 Restrictions are: Temporary   Physical Restrictions   Sitting:    Standing:    Stooping:    Bending:      Squatting:    Walking:    Climbing:    Pushing:      Pulling:    Other:    Reaching Above Shoulder (L):    Reaching Above Shoulder (R):       Reaching Below Shoulder (L):    Reaching Below Shoulder (R):      Not to exceed Weight Limits   Carrying(hrs):   Weight Limit(lb): < or = to 50 pounds Lifting(hrs):   Weight  Limit(lb): < or = to 50 pounds   Comments: Limit lift/push/pull to less than 50 pounds    Repetitive Actions   Hands: i.e. Fine Manipulations from Grasping:     Feet: i.e. Operating Foot Controls:     Driving / Operate Machinery:     Physician Name: Angel Ludwig D.O. Physician Signature: ANGEL Rock D.O. e-Signature: Dr. Shamar Tinoco, Medical Director   Clinic Name / Location: [address],   [address] [place] [address] Clinic Phone Number: Dept: [phone]   Appointment Time: 3:45 Pm Visit Start Time: 3:34 PM   Check-In Time:  3:32 Pm Visit Discharge Time:  4:06 PM   Original-Treating Physician or Chiropractor    Page 2-Insurer/TPA    Page 3-Employer    Page 4-Employee

## 2019-04-03 ENCOUNTER — TELEPHONE (OUTPATIENT)
Dept: OCCUPATIONAL MEDICINE | Facility: CLINIC | Age: 19
End: 2019-04-03

## 2019-04-04 ENCOUNTER — OCCUPATIONAL MEDICINE (OUTPATIENT)
Dept: OCCUPATIONAL MEDICINE | Facility: CLINIC | Age: 19
End: 2019-04-04
Payer: COMMERCIAL

## 2019-04-04 VITALS
DIASTOLIC BLOOD PRESSURE: 74 MMHG | TEMPERATURE: 98.8 F | OXYGEN SATURATION: 95 % | WEIGHT: 158 LBS | HEART RATE: 82 BPM | SYSTOLIC BLOOD PRESSURE: 108 MMHG | HEIGHT: 66 IN | BODY MASS INDEX: 25.39 KG/M2

## 2019-04-04 DIAGNOSIS — S39.012D STRAIN OF LUMBAR REGION, SUBSEQUENT ENCOUNTER: ICD-10-CM

## 2019-04-04 PROCEDURE — 99213 OFFICE O/P EST LOW 20 MIN: CPT | Performed by: PREVENTIVE MEDICINE

## 2019-04-04 ASSESSMENT — ENCOUNTER SYMPTOMS
SENSORY CHANGE: 0
TINGLING: 0

## 2019-04-04 NOTE — PROGRESS NOTES
"Subjective:      Paxton Gómez is a 19 y.o. male who presents with Follow-Up (DOI 1/9/2019- L back - same )      DOI 1/9/2019: 19 yo male presents with lower back pain.  He was pulling something out of the cage and felt sharp pull in the lower back.  The last visit he was referred for more physical therapy and MRI lumbar spine, however these were denied until the work comp insurance receives medical records of prior back injury.  Patient states overall he feels lower back pain is about the same to slightly improved.  He states that it is not a sore as before.  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  He states that he thought he sent a medical release form to the work comp insurance over month ago and so is unsure why they denied referrals.     HPI    Review of Systems   Neurological: Negative for tingling and sensory change.     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     Objective:     /74   Pulse 82   Temp 37.1 °C (98.8 °F)   Ht 1.676 m (5' 6\")   Wt 71.7 kg (158 lb)   SpO2 95%   BMI 25.50 kg/m²      Physical Exam   Constitutional: He is oriented to person, place, and time. He appears well-developed and well-nourished.   Cardiovascular: Normal rate.    Pulmonary/Chest: Effort normal.   Neurological: He is alert and oriented to person, place, and time.   Skin: Skin is warm and dry.   Psychiatric: He has a normal mood and affect. Judgment normal.       Lumbar: No gross deformity.  Full range of motion with some pain at the end of flexion.  Minimal tenderness L5.  Normal gait.       Assessment/Plan:     1. Strain of lumbar region, subsequent encounter  Referral for physical therapy and MRI denied, advised patient to call work comp insurance carrier what is needed  OTC ibuprofen as needed  Full duty  Follow-up 3 weeks      "

## 2019-04-04 NOTE — LETTER
[address],   [address] [place] [address]  Phone:  [phone] - Fax:  [phone]   [facility] Progress Report and Disability Certification  Date of Service: 4/4/2019   No Show:  No  Date / Time of Next Visit: 4/25/2019 0345pm   Claim Information   Patient Name: Paxton Gómez  Claim Number:     Employer: [employer]  Date of Injury: 1/9/2019     Insurer / TPA: [employer]  ID / SSN:     Occupation: ACS  Diagnosis: The encounter diagnosis was Strain of lumbar region, subsequent encounter.    Medical Information   Related to Industrial Injury? Yes    Subjective Complaints:  DOI 1/9/2019: 17 yo male presents with lower back pain.  He was pulling something out of the cage and felt sharp pull in the lower back.  The last visit he was referred for more physical therapy and MRI lumbar spine, however these were denied until the work comp insurance receives medical records of prior back injury.  Patient states overall he feels lower back pain is about the same to slightly improved.  He states that it is not a sore as before.  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  He states that he thought he sent a medical release form to the work comp insurance over month ago and so is unsure why they denied referrals.   Objective Findings: Lumbar: No gross deformity.  Full range of motion with some pain at the end of flexion.  Minimal tenderness L5.  Normal gait.   Pre-Existing Condition(s):     Assessment:   Condition Same    Status: Additional Care Required  Permanent Disability:No    Plan:      Diagnostics:      Comments:  Referral for physical therapy and MRI denied, advised patient to call work comp insurance carrier what is needed  OTC ibuprofen as needed  Full duty  Follow-up 3 weeks    Disability Information   Status:  Released to Full Duty    From:  4/4/2019  Through: 4/25/2019 Restrictions are:     Physical Restrictions   Sitting:    Standing:    Stooping:    Bending:      Squatting:    Walking:    Climbing:    Pushing:      Pulling:    Other:    Reaching Above Shoulder (L):   Reaching Above Shoulder (R):       Reaching Below Shoulder (L):    Reaching Below Shoulder (R):      Not to exceed Weight Limits   Carrying(hrs):   Weight Limit(lb):   Lifting(hrs):   Weight  Limit(lb):     Comments:      Repetitive Actions   Hands: i.e. Fine Manipulations from Grasping:     Feet: i.e. Operating Foot Controls:     Driving / Operate Machinery:     Physician Name: Angel Ludwig D.O. Physician Signature: ANGEL Rock D.O. e-Signature: Dr. Adiel Baumann, Medical Director   Clinic Name / Location: [address],   [address] [place] [address] Clinic Phone Number: Dept: [phone]   Appointment Time: 3:45 Pm Visit Start Time: 3:40 PM   Check-In Time:  3:36 Pm Visit Discharge Time:  0356pm   Original-Treating Physician or Chiropractor    Page 2-Insurer/TPA    Page 3-Employer    Page 4-Employee

## 2019-04-25 ENCOUNTER — OCCUPATIONAL MEDICINE (OUTPATIENT)
Dept: OCCUPATIONAL MEDICINE | Facility: CLINIC | Age: 19
End: 2019-04-25
Payer: COMMERCIAL

## 2019-04-25 VITALS
WEIGHT: 156.2 LBS | HEART RATE: 84 BPM | SYSTOLIC BLOOD PRESSURE: 110 MMHG | TEMPERATURE: 98.4 F | OXYGEN SATURATION: 98 % | BODY MASS INDEX: 25.1 KG/M2 | DIASTOLIC BLOOD PRESSURE: 70 MMHG | RESPIRATION RATE: 14 BRPM | HEIGHT: 66 IN

## 2019-04-25 DIAGNOSIS — S39.012D STRAIN OF LUMBAR REGION, SUBSEQUENT ENCOUNTER: ICD-10-CM

## 2019-04-25 PROCEDURE — 99213 OFFICE O/P EST LOW 20 MIN: CPT | Performed by: PREVENTIVE MEDICINE

## 2019-04-25 ASSESSMENT — ENCOUNTER SYMPTOMS
TINGLING: 0
SENSORY CHANGE: 0

## 2019-04-25 NOTE — LETTER
[address],   [address] [place] [address]  Phone:  [phone] - Fax:  [phone]   [facility] Progress Report and Disability Certification  Date of Service: 4/25/2019   No Show:  No  Date / Time of Next Visit: 5/23/2019 @ 3;30 Pm   Claim Information   Patient Name: Paxton Gómez  Claim Number:     Employer: [employer]  Date of Injury: 1/9/2019     Insurer / TPA: [employer]  ID / SSN:     Occupation: ACS  Diagnosis: The encounter diagnosis was Strain of lumbar region, subsequent encounter.    Medical Information   Related to Industrial Injury? Yes    Subjective Complaints:  DOI 1/9/2019: 19 yo male presents with lower back pain.  He was pulling something out of the cage and felt sharp pull in the lower back.  Patient states overall symptoms about the same.  He states for the most part has minimal pain.  However with lifting more awkward objects where he can use good lifting mechanics he notes some pain still.  Overall he has been tolerating full duty.  He is still concerned with the prolonged symptoms.  He states that the work comp insurance is waiting for his medical records.   Objective Findings: Lumbar: No gross deformity.  Full range of motion with minimal discomfort.  Normal gait.      Pre-Existing Condition(s):     Assessment:   Condition Same    Status: Additional Care Required  Permanent Disability:No    Plan:      Diagnostics:      Comments:  Patient is going to follow-up on medical records, advised that once confirmed that work comp insurance has received medical records patient is to call here so that referral for MRI can be placed  OTC ibuprofen as needed  Continue full duty  Follow-up   4 weeks    Disability Information   Status: Released to Full Duty    From:  4/25/2019  Through: 5/23/2019 Restrictions are:     Physical Restrictions   Sitting:    Standing:    Stooping:    Bending:      Squatting:   Walking:    Climbing:    Pushing:      Pulling:    Other:    Reaching Above Shoulder (L):   Reaching Above Shoulder (R):       Reaching Below Shoulder (L):    Reaching Below Shoulder (R):      Not to exceed Weight Limits   Carrying(hrs):   Weight Limit(lb):   Lifting(hrs):   Weight  Limit(lb):     Comments:      Repetitive Actions   Hands: i.e. Fine Manipulations from Grasping:     Feet: i.e. Operating Foot Controls:     Driving / Operate Machinery:     Physician Name: Angel Ludwig D.O. Physician Signature: ANGEL Rock D.O. e-Signature: Dr. Adiel Baumann, Medical Director   Clinic Name / Location: [address],   [address]  [place] [address] Clinic Phone Number: Dept: [phone]   Appointment Time: 3:45 Pm Visit Start Time: 3:31 PM   Check-In Time:  3:15 Pm Visit Discharge Time:  3:2 PM   Original-Treating Physician or Chiropractor    Page 2-Insurer/TPA    Page 3-Employer    Page 4-Employee

## 2019-04-25 NOTE — PROGRESS NOTES
"Subjective:      Paxton Gómez is a 19 y.o. male who presents with Other (WC FV DOI 1/9/2019 back, same, rm 17)      DOI 1/9/2019: 17 yo male presents with lower back pain.  He was pulling something out of the cage and felt sharp pull in the lower back.  Patient states overall symptoms about the same.  He states for the most part has minimal pain.  However with lifting more awkward objects where he can use good lifting mechanics he notes some pain still.  Overall he has been tolerating full duty.  He is still concerned with the prolonged symptoms.  He states that the work comp insurance is waiting for his medical records.     HPI    Review of Systems   Neurological: Negative for tingling and sensory change.     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       Objective:     /70   Pulse 84   Temp 36.9 °C (98.4 °F)   Resp 14   Ht 1.676 m (5' 6\")   Wt 70.9 kg (156 lb 3.2 oz)   SpO2 98%   BMI 25.21 kg/m²      Physical Exam   Constitutional: He is oriented to person, place, and time. He appears well-developed and well-nourished.   Cardiovascular: Normal rate.    Pulmonary/Chest: Effort normal.   Neurological: He is alert and oriented to person, place, and time.   Skin: Skin is warm and dry.   Psychiatric: He has a normal mood and affect. Judgment normal.       Lumbar: No gross deformity.  Full range of motion with minimal discomfort.  Normal gait.          Assessment/Plan:     1. Strain of lumbar region, subsequent encounter  Patient is going to follow-up on medical records, advised that once confirmed that work comp insurance has received medical records patient is to call here so that referral for MRI can be placed  OTC ibuprofen as needed  Continue full duty  Follow-up 4 weeks      "

## 2019-05-22 ENCOUNTER — TELEPHONE (OUTPATIENT)
Dept: OCCUPATIONAL MEDICINE | Facility: CLINIC | Age: 19
End: 2019-05-22

## 2019-05-23 ENCOUNTER — OCCUPATIONAL MEDICINE (OUTPATIENT)
Dept: OCCUPATIONAL MEDICINE | Facility: CLINIC | Age: 19
End: 2019-05-23
Payer: COMMERCIAL

## 2019-05-23 VITALS
DIASTOLIC BLOOD PRESSURE: 62 MMHG | TEMPERATURE: 97.4 F | SYSTOLIC BLOOD PRESSURE: 110 MMHG | RESPIRATION RATE: 14 BRPM | OXYGEN SATURATION: 97 % | HEART RATE: 58 BPM | HEIGHT: 66 IN | BODY MASS INDEX: 25.39 KG/M2 | WEIGHT: 158 LBS

## 2019-05-23 DIAGNOSIS — S39.012D STRAIN OF LUMBAR REGION, SUBSEQUENT ENCOUNTER: ICD-10-CM

## 2019-05-23 PROCEDURE — 99213 OFFICE O/P EST LOW 20 MIN: CPT | Performed by: PREVENTIVE MEDICINE

## 2019-05-23 ASSESSMENT — ENCOUNTER SYMPTOMS
SENSORY CHANGE: 0
TINGLING: 1

## 2019-05-23 NOTE — LETTER
[address],   [address] [place] [address]  Phone:  [phone] - Fax:  [phone]   [facility] Progress Report and Disability Certification  Date of Service: 5/23/2019   No Show:  No  Date / Time of Next Visit: 6/11/2019 @ 3:45 PM   Claim Information   Patient Name: Paxton Gómez  Claim Number:     Employer: [employer]  Date of Injury: 1/9/2019     Insurer / TPA: [employer]  ID / SSN:     Occupation: ACS  Diagnosis: The encounter diagnosis was Strain of lumbar region, subsequent encounter.    Medical Information   Related to Industrial Injury? Yes    Subjective Complaints:  DOI 1/9/2019: 17 yo male presents with lower back pain.  He was pulling something out of the cage and felt sharp pull in the lower back.  Patient states that pain is a little bit worse since he has been pushing around the cages at work.  He states there is little bit of soreness in his lower back afterwards but not too severe.  MRI and physical therapy was approved.  He has physical therapy scheduled for the beginning of the month but is yet to schedule the MRI.   Objective Findings: Lumbar: No gross deformity.  Full range of motion with mild discomfort.  Normal gait.   Pre-Existing Condition(s):     Assessment:   Condition Same    Status: Additional Care Required  Permanent Disability:No    Plan:      Diagnostics:      Comments:  Schedule MRI at earliest convenience  Continue physical therapy  OTC ibuprofen as needed  Full duty  Follow-up 2 weeks    Disability Information   Status: Released to Full Duty    From:  5/23/2019  Through: 6/11/2019 Restrictions are:     Physical Restrictions   Sitting:    Standing:    Stooping:    Bending:      Squatting:    Walking:    Climbing:    Pushing:      Pulling:    Other:    Reaching Above Shoulder (L):   Reaching Above Shoulder (R):       Reaching Below Shoulder (L):    Reaching Below Shoulder (R):         Not to exceed Weight Limits   Carrying(hrs):   Weight Limit(lb):   Lifting(hrs):   Weight  Limit(lb):     Comments:      Repetitive Actions   Hands: i.e. Fine Manipulations from Grasping:     Feet: i.e. Operating Foot Controls:     Driving / Operate Machinery:     Physician Name: Angel Lr D.O. Physician Signature: belSignANGEL LR D.O. e-Signature: Dr. Adiel Baumann, Medical Director   Clinic Name / Location: [address],   [address] [place] [address] Clinic Phone Number: Dept: [phone]   Appointment Time: 3:30 Pm Visit Start Time: 3:36 PM   Check-In Time:  3:33 Pm Visit Discharge Time: 3:51 PM   Original-Treating Physician or Chiropractor    Page 2-Insurer/TPA    Page 3-Employer    Page 4-Employee

## 2019-05-23 NOTE — PROGRESS NOTES
"Subjective:      Paxton Gómez is a 19 y.o. male who presents with Other (WC FV DOI 1/9/2019 back, worse, rm 17))      DOI 1/9/2019: 17 yo male presents with lower back pain.  He was pulling something out of the cage and felt sharp pull in the lower back.  Patient states that pain is a little bit worse since he has been pushing around the cages at work.  He states there is little bit of soreness in his lower back afterwards but not too severe.  MRI and physical therapy was approved.  He has physical therapy scheduled for the beginning of the month but is yet to schedule the MRI.     HPI    Review of Systems   Neurological: Positive for tingling. Negative for sensory change.     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     Objective:     /62   Pulse (!) 58   Temp 36.3 °C (97.4 °F)   Resp 14   Ht 1.676 m (5' 6\")   Wt 71.7 kg (158 lb)   SpO2 97%   BMI 25.50 kg/m²      Physical Exam   Constitutional: He is oriented to person, place, and time. He appears well-developed and well-nourished.   Cardiovascular: Normal rate.    Pulmonary/Chest: Effort normal.   Neurological: He is alert and oriented to person, place, and time.   Skin: Skin is warm and dry.   Psychiatric: He has a normal mood and affect. His behavior is normal. Judgment normal.       Lumbar: No gross deformity.  Full range of motion with mild discomfort.  Normal gait.       Assessment/Plan:     1. Strain of lumbar region, subsequent encounter  Schedule MRI at earliest convenience  Continue physical therapy  OTC ibuprofen as needed  Full duty  Follow-up 2 weeks      "

## 2019-06-10 ENCOUNTER — TELEPHONE (OUTPATIENT)
Dept: OCCUPATIONAL MEDICINE | Facility: CLINIC | Age: 19
End: 2019-06-10

## 2019-06-11 ENCOUNTER — OCCUPATIONAL MEDICINE (OUTPATIENT)
Dept: OCCUPATIONAL MEDICINE | Facility: CLINIC | Age: 19
End: 2019-06-11
Payer: COMMERCIAL

## 2019-06-11 VITALS
HEIGHT: 66 IN | HEART RATE: 78 BPM | SYSTOLIC BLOOD PRESSURE: 104 MMHG | WEIGHT: 158 LBS | DIASTOLIC BLOOD PRESSURE: 68 MMHG | OXYGEN SATURATION: 98 % | RESPIRATION RATE: 14 BRPM | BODY MASS INDEX: 25.39 KG/M2 | TEMPERATURE: 98.9 F

## 2019-06-11 DIAGNOSIS — M51.36 DDD (DEGENERATIVE DISC DISEASE), LUMBAR: ICD-10-CM

## 2019-06-11 DIAGNOSIS — S39.012D STRAIN OF LUMBAR REGION, SUBSEQUENT ENCOUNTER: ICD-10-CM

## 2019-06-11 PROCEDURE — 99213 OFFICE O/P EST LOW 20 MIN: CPT | Performed by: PREVENTIVE MEDICINE

## 2019-06-11 ASSESSMENT — ENCOUNTER SYMPTOMS
TINGLING: 0
SENSORY CHANGE: 0

## 2019-06-11 ASSESSMENT — PAIN SCALES - GENERAL: PAINLEVEL: 5=MODERATE PAIN

## 2019-06-11 NOTE — LETTER
[address],   [address] [place] [address]  Phone:  [phone] - Fax:  [phone]   [facility] Progress Report and Disability Certification  Date of Service: 6/11/2019   No Show:  No  Date / Time of Next Visit: 7/9/2019 @ 3:45 PM   Claim Information   Patient Name: Paxton Gómez  Claim Number:     Employer: [employer]  Date of Injury:      Insurer / TPA: [employer]  ID / SSN:     Occupation:  ACS Diagnosis: Diagnoses of Strain of lumbar region, subsequent encounter and DDD (degenerative disc disease), lumbar were pertinent to this visit.    Medical Information   Related to Industrial Injury? Yes    Subjective Complaints:  DOI 1/9/2019: 19 yo male presents with lower back pain.  He was pulling something out of the cage and felt sharp pull in the lower back.  Patient states that overall lower back pain is little bit worse.  Continues to get some pain down the left leg.  Currently working full duty.  MRI performed last week.   Objective Findings: Lumbar: No gross deformity.  Full range of motion with some discomfort.  Normal gait.    MRI lumbar: Early degenerative disc disease L5-S1 but no nerve root impingement   Pre-Existing Condition(s):     Assessment:   Condition Same    Status: Additional Care Required  Permanent Disability:No    Plan:      Diagnostics:      Comments:  Given prolonged symptoms referral to physiatry  OTC ibuprofen as needed  Full duty  Follow-up 4 weeks    Disability Information   Status: Released to Full Duty    From:  6/11/2019  Through: 7/9/2019 Restrictions are:     Physical Restrictions   Sitting:    Standing:    Stooping:    Bending:      Squatting:    Walking:    Climbing:    Pushing:      Pulling:    Other:    Reaching Above Shoulder (L):   Reaching Above Shoulder (R):       Reaching Below Shoulder (L):    Reaching Below Shoulder (R):      Not to exceed Weight Limits   Carrying(hrs):    Weight Limit(lb):   Lifting(hrs):   Weight  Limit(lb):     Comments:      Repetitive Actions   Hands: i.e. Fine Manipulations from Grasping:     Feet: i.e. Operating Foot Controls:     Driving / Operate Machinery:     Physician Name: Angel Ludwig D.O. Physician Signature: ANGEL Rock D.O. e-Signature: Dr. Adiel Baumann, Medical Director   Clinic Name / Location: [address],   [address] [place] [address] Clinic Phone Number: Dept: [phone]   Appointment Time: 3:45 Pm Visit Start Time: 3:44 PM   Check-In Time:  3:41 Pm Visit Discharge Time: 4:02 PM   Original-Treating Physician or Chiropractor    Page 2-Insurer/TPA    Page 3-Employer    Page 4-Employee

## 2019-06-11 NOTE — PROGRESS NOTES
"Subjective:      aPxton Gómez is a 19 y.o. male who presents with Other (WC FV DOI 1/9/2019 back - worse - rm 16)      DOI 1/9/2019: 19 yo male presents with lower back pain.  He was pulling something out of the cage and felt sharp pull in the lower back.  Patient states that overall lower back pain is little bit worse.  Continues to get some pain down the left leg.  Currently working full duty.  MRI performed last week.     HPI    Review of Systems   Neurological: Negative for tingling and sensory change.     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     Objective:     /68   Pulse 78   Temp 37.2 °C (98.9 °F) (Temporal)   Resp 14   Ht 1.676 m (5' 6\")   Wt 71.7 kg (158 lb)   SpO2 98%   BMI 25.50 kg/m²      Physical Exam   Constitutional: He is oriented to person, place, and time. He appears well-developed and well-nourished.   Cardiovascular: Normal rate.    Pulmonary/Chest: Effort normal.   Neurological: He is alert and oriented to person, place, and time.   Skin: Skin is warm and dry.   Psychiatric: He has a normal mood and affect. Judgment normal.       Lumbar: No gross deformity.  Full range of motion with some discomfort.  Normal gait.    MRI lumbar: Early degenerative disc disease L5-S1 but no nerve root impingement       Assessment/Plan:     1. Strain of lumbar region, subsequent encounter  - REFERRAL TO PHYSIATRY (PMR)  2. DDD (degenerative disc disease), lumbar  - REFERRAL TO PHYSIATRY (PMR)    Given prolonged symptoms referral to physiatry  OTC ibuprofen as needed  Full duty  Follow-up 4 weeks    "

## 2019-08-02 ENCOUNTER — TELEPHONE (OUTPATIENT)
Dept: OCCUPATIONAL MEDICINE | Facility: CLINIC | Age: 19
End: 2019-08-02

## 2019-08-02 ENCOUNTER — OFFICE VISIT (OUTPATIENT)
Dept: URGENT CARE | Facility: CLINIC | Age: 19
End: 2019-08-02
Payer: COMMERCIAL

## 2019-08-02 VITALS
DIASTOLIC BLOOD PRESSURE: 82 MMHG | BODY MASS INDEX: 25.07 KG/M2 | HEART RATE: 101 BPM | TEMPERATURE: 99.4 F | WEIGHT: 156 LBS | SYSTOLIC BLOOD PRESSURE: 108 MMHG | OXYGEN SATURATION: 99 % | RESPIRATION RATE: 18 BRPM | HEIGHT: 66 IN

## 2019-08-02 DIAGNOSIS — J03.90 EXUDATIVE TONSILLITIS: ICD-10-CM

## 2019-08-02 LAB
INT CON NEG: NEGATIVE
INT CON POS: POSITIVE
S PYO AG THROAT QL: NEGATIVE

## 2019-08-02 PROCEDURE — 99214 OFFICE O/P EST MOD 30 MIN: CPT | Performed by: FAMILY MEDICINE

## 2019-08-02 PROCEDURE — 87880 STREP A ASSAY W/OPTIC: CPT | Performed by: FAMILY MEDICINE

## 2019-08-02 RX ORDER — PREDNISONE 20 MG/1
60 TABLET ORAL ONCE
Qty: 3 TAB | Refills: 0 | Status: SHIPPED | OUTPATIENT
Start: 2019-08-02 | End: 2019-08-02

## 2019-08-02 RX ORDER — PENICILLIN V POTASSIUM 500 MG/1
500 TABLET ORAL 2 TIMES DAILY
Qty: 20 TAB | Refills: 0 | Status: SHIPPED | OUTPATIENT
Start: 2019-08-02 | End: 2019-08-12

## 2019-08-02 ASSESSMENT — ENCOUNTER SYMPTOMS
SINUS PAIN: 0
WEIGHT LOSS: 0
EYE REDNESS: 0
EYE DISCHARGE: 0

## 2019-08-02 NOTE — LETTER
August 2, 2019         Patient: Paxton Gómez   YOB: 2000   Date of Visit: 8/2/2019           To Whom it May Concern:    Paxton Gómez was seen in my clinic on 8/2/2019. Please excuse 8/2 and 8/3/2019.      Sincerely,           Jeffery Mendoza M.D.  Electronically Signed

## 2019-08-03 NOTE — PROGRESS NOTES
"Subjective:      Paxton Gómez is a 19 y.o. male who presents with Fever (aches,headaches, sore throat-x2 days)            2 days sore throat.  Subjective fever chills.  Associated headache and myalgia.  Has tonsils.  Already fluids with normal urine output.  No rash.  No cough.  Minimal relief with OTC medications.  No other aggravating or alleviating factors.      Review of Systems   Constitutional: Positive for malaise/fatigue. Negative for weight loss.   HENT: Negative for ear discharge, ear pain and sinus pain.    Eyes: Negative for discharge and redness.   Skin: Negative for itching and rash.     .  Medications, Allergies, and current problem list reviewed today in Epic       Objective:     /82 (BP Location: Left arm)   Pulse (!) 101   Temp 37.4 °C (99.4 °F) (Temporal)   Resp 18   Ht 1.676 m (5' 6\")   Wt 70.8 kg (156 lb)   SpO2 99%   BMI 25.18 kg/m²      Physical Exam   Constitutional: He is oriented to person, place, and time. He appears well-developed and well-nourished. No distress.   HENT:   Head: Normocephalic and atraumatic.   Right Ear: External ear normal.   Left Ear: External ear normal.   2+ red tonsils with purulent exudate. No soft palate petechiae. No evidence of abscess.     Eyes: Conjunctivae are normal.   Cardiovascular: Regular rhythm and normal heart sounds.   rapid   Pulmonary/Chest: Effort normal and breath sounds normal.   Neurological: He is alert and oriented to person, place, and time. He exhibits normal muscle tone.   Skin: Skin is warm and dry. No rash noted.               Assessment/Plan:   Rapid strep negative    1. Exudative tonsillitis  POCT Rapid Strep A    lidocaine viscous 2% (XYLOCAINE) 2 % Solution    penicillin v potassium (VEETID) 500 MG Tab    predniSONE (DELTASONE) 20 MG Tab     Differential diagnosis, natural history, supportive care, and indications for immediate follow-up discussed at length.     Contingent antibiotic prescription given to " patient to fill upon meeting criteria of guidelines discussed.

## 2019-08-05 ENCOUNTER — APPOINTMENT (OUTPATIENT)
Dept: OCCUPATIONAL MEDICINE | Facility: CLINIC | Age: 19
End: 2019-08-05

## 2019-08-07 ENCOUNTER — OCCUPATIONAL MEDICINE (OUTPATIENT)
Dept: OCCUPATIONAL MEDICINE | Facility: CLINIC | Age: 19
End: 2019-08-07
Payer: COMMERCIAL

## 2019-08-07 VITALS
HEART RATE: 76 BPM | WEIGHT: 158 LBS | OXYGEN SATURATION: 98 % | HEIGHT: 66 IN | TEMPERATURE: 98.5 F | BODY MASS INDEX: 25.39 KG/M2

## 2019-08-07 DIAGNOSIS — M51.36 DDD (DEGENERATIVE DISC DISEASE), LUMBAR: ICD-10-CM

## 2019-08-07 DIAGNOSIS — S39.012D STRAIN OF LUMBAR REGION, SUBSEQUENT ENCOUNTER: ICD-10-CM

## 2019-08-07 PROCEDURE — 99212 OFFICE O/P EST SF 10 MIN: CPT | Performed by: PREVENTIVE MEDICINE

## 2019-08-08 NOTE — PROGRESS NOTES
"Subjective:      Paxton Gómez is a 19 y.o. male who presents with Other (WC DOI  1/9/2019 back - feeling the same - rm 16))      DOI 1/9/2019: 17 yo male presents with lower back pain.  He was pulling something out of the cage and felt sharp pull in the lower back.  Patient feels overall about the same.  He states he gets occasional soreness throughout the workday.  He has been strengthening his gluteus medius which he feels like is been helping somewhat.  He saw Dr. Bowens at [facility] spine and he was recommended to do epidural injections.  He is awaiting approval for those currently.     HPI    ROS       Objective:     Pulse 76   Temp 36.9 °C (98.5 °F)   Ht 1.676 m (5' 6\")   Wt 71.7 kg (158 lb)   SpO2 98%   BMI 25.50 kg/m²      Physical Exam    Lumbar: No gross deformity.  Full range of motion.  Normal gait     MRI lumbar: Early degenerative disc disease L5-S1 but no nerve root impingement       Assessment/Plan:     1. Strain of lumbar region, subsequent encounter  2. DDD (degenerative disc disease), lumbar    Transfer care to physiatry  Full duty  Follow-up as needed    "

## 2019-11-06 ENCOUNTER — OCCUPATIONAL MEDICINE (OUTPATIENT)
Dept: OCCUPATIONAL MEDICINE | Facility: CLINIC | Age: 19
End: 2019-11-06
Payer: COMMERCIAL

## 2019-11-06 VITALS
HEART RATE: 94 BPM | OXYGEN SATURATION: 94 % | DIASTOLIC BLOOD PRESSURE: 88 MMHG | BODY MASS INDEX: 25.71 KG/M2 | SYSTOLIC BLOOD PRESSURE: 128 MMHG | HEIGHT: 66 IN | WEIGHT: 160 LBS | TEMPERATURE: 98.6 F

## 2019-11-06 DIAGNOSIS — S39.012D STRAIN OF LUMBAR REGION, SUBSEQUENT ENCOUNTER: ICD-10-CM

## 2019-11-06 PROCEDURE — 99212 OFFICE O/P EST SF 10 MIN: CPT | Performed by: PREVENTIVE MEDICINE

## 2019-11-06 NOTE — LETTER
[address],   [address] [place] [address]  Phone:  [phone] - Fax:  [phone]   [facility] Progress Report and Disability Certification  Date of Service: 11/6/2019   No Show:  No  Date / Time of Next Visit:   [facility] physiatry   Claim Information   Patient Name: Paxton Gómez  Claim Number:     Employer: [employer]  Date of Injury:      Insurer / TPA: [employer]  ID / SSN:     Occupation:   Diagnosis: The encounter diagnosis was Strain of lumbar region, subsequent encounter.    Medical Information   Related to Industrial Injury? Yes    Subjective Complaints:  DOI 1/9/2019: 17 yo male presents with lower back pain.  He was pulling something out of the cage and felt sharp pull in the lower back.  Patient was seen by physiatry and they recommended epidural injections and possible trigger point injections as well. He states that epidural was denied. Has not heard from physiatry clinic. Pain more or less the same continues with radiating radiating pain and numbness down the left leg.  Still able to work full duty   Objective Findings: Lumbar: No gross deformity.  Full range of motion.  Normal gait     MRI lumbar: Early degenerative disc disease L5-S1 but no nerve root impingement   Pre-Existing Condition(s):     Assessment:   Condition Same    Status: Discharged / Care Transfer  Permanent Disability:No    Plan:      Diagnostics:      Comments:  Recommend completing treatments recommended by physiatry  Upon completion of those recommended treatments case will be at [facility]  Released from care  Full duty    Disability Information   Status: Released to Full Duty    From:  11/6/2019  Through:   Restrictions are:     Physical Restrictions   Sitting:    Standing:    Stooping:    Bending:      Squatting:    Walking:    Climbing:    Pushing:      Pulling:    Other:    Reaching Above Shoulder (L):   Reaching Above Shoulder (R):         Reaching Below Shoulder (L):    Reaching Below Shoulder (R):      Not to exceed Weight Limits   Carrying(hrs):   Weight Limit(lb):   Lifting(hrs):   Weight  Limit(lb):     Comments:      Repetitive Actions   Hands: i.e. Fine Manipulations from Grasping:     Feet: i.e. Operating Foot Controls:     Driving / Operate Machinery:     Physician Name: Angel Ludwig D.O. Physician Signature: ANGEL Rock D.O. e-Signature: Dr. Adiel Baumann, Medical Director   Clinic Name / Location: [address],   [address]  [place] [address] Clinic Phone Number: Dept: [phone]   Appointment Time: 4:45 Pm Visit Start Time: 4:20 PM   Check-In Time:  4:15 Pm Visit Discharge Time:  4:45pm   Original-Treating Physician or Chiropractor    Page 2-Insurer/TPA    Page 3-Employer    Page 4-Employee

## 2019-11-07 NOTE — PROGRESS NOTES
"Subjective:      Paxton Gómez is a 19 y.o. male who presents with Other (WC DOI 1/9/2019 back - feeling the same - rm 16)      DOI 1/9/2019: 19 yo male presents with lower back pain.  He was pulling something out of the cage and felt sharp pull in the lower back.  Patient was seen by physiatry and they recommended epidural injections and possible trigger point injections as well. He states that epidural was denied. Has not heard from physiatry clinic. Pain more or less the same continues with radiating radiating pain and numbness down the left leg.  Still able to work full duty     HPI    ROS       Objective:     /88   Pulse 94   Temp 37 °C (98.6 °F)   Ht 1.676 m (5' 6\")   Wt 72.6 kg (160 lb)   SpO2 94%   BMI 25.82 kg/m²      Physical Exam    Lumbar: No gross deformity.  Full range of motion.  Normal gait     MRI lumbar: Early degenerative disc disease L5-S1 but no nerve root impingement       Assessment/Plan:       1. Strain of lumbar region, subsequent encounter  Recommend completing treatments recommended by physiatry  Upon completion of those recommended treatments case will be at [facility]  Released from care  Full duty    "

## 2019-12-17 ENCOUNTER — OFFICE VISIT (OUTPATIENT)
Dept: URGENT CARE | Facility: PHYSICIAN GROUP | Age: 19
End: 2019-12-17
Payer: COMMERCIAL

## 2019-12-17 VITALS
RESPIRATION RATE: 20 BRPM | DIASTOLIC BLOOD PRESSURE: 64 MMHG | HEIGHT: 66 IN | HEART RATE: 98 BPM | SYSTOLIC BLOOD PRESSURE: 123 MMHG | TEMPERATURE: 98.7 F | WEIGHT: 150.8 LBS | BODY MASS INDEX: 24.23 KG/M2 | OXYGEN SATURATION: 96 %

## 2019-12-17 DIAGNOSIS — R05.9 COUGH: ICD-10-CM

## 2019-12-17 DIAGNOSIS — J10.1 INFLUENZA A: ICD-10-CM

## 2019-12-17 LAB
FLUAV+FLUBV AG SPEC QL IA: NORMAL
INT CON NEG: NEGATIVE
INT CON POS: POSITIVE

## 2019-12-17 PROCEDURE — 87804 INFLUENZA ASSAY W/OPTIC: CPT | Performed by: PHYSICIAN ASSISTANT

## 2019-12-17 PROCEDURE — 99214 OFFICE O/P EST MOD 30 MIN: CPT | Performed by: PHYSICIAN ASSISTANT

## 2019-12-17 RX ORDER — DEXTROMETHORPHAN HYDROBROMIDE AND PROMETHAZINE HYDROCHLORIDE 15; 6.25 MG/5ML; MG/5ML
5 SYRUP ORAL 4 TIMES DAILY PRN
Qty: 100 ML | Refills: 0 | Status: SHIPPED | OUTPATIENT
Start: 2019-12-17 | End: 2019-12-22

## 2019-12-17 RX ORDER — BENZONATATE 100 MG/1
200 CAPSULE ORAL 3 TIMES DAILY PRN
Qty: 60 CAP | Refills: 0 | Status: SHIPPED | OUTPATIENT
Start: 2019-12-17 | End: 2023-07-19

## 2019-12-17 ASSESSMENT — ENCOUNTER SYMPTOMS
WHEEZING: 0
HEADACHES: 1
CHILLS: 1
MYALGIAS: 1
SHORTNESS OF BREATH: 0
SPUTUM PRODUCTION: 0
FEVER: 1
SORE THROAT: 1
COUGH: 1

## 2019-12-17 NOTE — LETTER
December 17, 2019         Patient: Paxton Gómez   YOB: 2000   Date of Visit: 12/17/2019           To Whom it May Concern:    Paxton Gómez was seen in my clinic on 12/17/2019. He may return to work on 12/20/19..    If you have any questions or concerns, please don't hesitate to call.        Sincerely,           Tg Sagastume P.A.-C.  Electronically Signed

## 2019-12-18 NOTE — PROGRESS NOTES
"Subjective:   Paxton Gómez  is a 19 y.o. male who presents for Sore Throat (fever, body aches x3 days)    This is a new problem.  Patient presents to urgent care with 2-day history of fatigue, malaise, generalized body aches with onset yesterday of nasal congestion, sore throat, cough.  Cough is nonproductive.  Patient also reports fever and chills.  Patient has been taking DayQuil/NyQuil with minimal improvement in symptoms.    No known exposure to strep or flu.  Patient has not had influenza vaccine this season.      HPI  Review of Systems   Constitutional: Positive for chills, fever and malaise/fatigue.   HENT: Positive for congestion and sore throat. Negative for ear pain.    Respiratory: Positive for cough. Negative for sputum production, shortness of breath and wheezing.    Musculoskeletal: Positive for myalgias.   Neurological: Positive for headaches.   All other systems reviewed and are negative.    Allergies   Allergen Reactions   • Cyclobenzaprine Swelling     Pt reports that it made his face swell     Reviewed past medical, surgical and family history.  Reviewed prescription and over-the-counter medications with patient and electronic health record today.     Objective:   /64 (BP Location: Left arm, Patient Position: Sitting, BP Cuff Size: Adult)   Pulse 98   Temp 37.1 °C (98.7 °F) (Temporal)   Resp 20   Ht 1.676 m (5' 6\")   Wt 68.4 kg (150 lb 12.8 oz)   SpO2 96%   BMI 24.34 kg/m²   Physical Exam  Vitals signs reviewed.   Constitutional:       Appearance: He is well-developed. He is not ill-appearing or toxic-appearing.   HENT:      Head: Normocephalic and atraumatic.      Right Ear: Tympanic membrane, ear canal and external ear normal.      Left Ear: Tympanic membrane, ear canal and external ear normal.      Nose: Mucosal edema, congestion and rhinorrhea present.      Right Turbinates: Swollen.      Left Turbinates: Swollen.      Right Sinus: No maxillary sinus tenderness or " frontal sinus tenderness.      Left Sinus: No maxillary sinus tenderness or frontal sinus tenderness.      Mouth/Throat:      Lips: Pink.      Mouth: Mucous membranes are moist.      Pharynx: Uvula midline. Posterior oropharyngeal erythema present. No oropharyngeal exudate.      Tonsils: No tonsillar exudate. Swellin+ on the right. 1+ on the left.   Eyes:      Conjunctiva/sclera: Conjunctivae normal.      Pupils: Pupils are equal, round, and reactive to light.   Neck:      Musculoskeletal: Normal range of motion and neck supple.   Cardiovascular:      Rate and Rhythm: Normal rate and regular rhythm.      Heart sounds: Normal heart sounds. No murmur. No friction rub.   Pulmonary:      Effort: Pulmonary effort is normal. No respiratory distress.      Breath sounds: Normal breath sounds.   Abdominal:      General: Bowel sounds are normal.      Palpations: Abdomen is soft.      Tenderness: There is no tenderness.   Musculoskeletal: Normal range of motion.   Lymphadenopathy:      Head:      Right side of head: No submental, submandibular or tonsillar adenopathy.      Left side of head: No submental, submandibular or tonsillar adenopathy.      Cervical: No cervical adenopathy.      Upper Body:      Right upper body: No supraclavicular adenopathy.      Left upper body: No supraclavicular adenopathy.   Skin:     General: Skin is warm and dry.      Findings: No rash.   Neurological:      Mental Status: He is alert and oriented to person, place, and time.      Cranial Nerves: No cranial nerve deficit.      Sensory: No sensory deficit.      Motor: Motor function is intact.      Coordination: Coordination normal.   Psychiatric:         Attention and Perception: Attention normal.         Mood and Affect: Mood normal.         Speech: Speech normal.         Behavior: Behavior normal.         Thought Content: Thought content normal.         Judgment: Judgment normal.           Assessment/Plan:   1. Influenza A  - POCT Influenza  A/B    2. Cough  - benzonatate (TESSALON) 100 MG Cap; Take 2 Caps by mouth 3 times a day as needed.  Dispense: 60 Cap; Refill: 0  - promethazine-dextromethorphan (PROMETHAZINE-DM) 6.25-15 MG/5ML syrup; Take 5 mL by mouth 4 times a day as needed for Cough for up to 5 days.  Dispense: 100 mL; Refill: 0    Patient is positive for influenza A.  Symptomatic, supportive care.  Increase fluids, rest.  Reviewed with patient that he is not in a high risk category and therefore Tamiflu is not warranted.  Patient is given Tessalon Perles as needed for cough and a small supply of Promethazine DM for nighttime use.  Patient is counseled on not driving while taking this medication as it can cause drowsiness.  Nasal saline  Coolmist humidifier   Patient may use salt water gargles, ice pops, cool fluids.    May use Tylenol or ibuprofen over-the-counter as needed for pain or fever not to exceed recommended daily dose.      Differential diagnosis, natural history, supportive care, and indications for immediate follow-up discussed.     Red flag warning symptoms and strict ER/follow-up precautions given.  The patient demonstrated a good understanding and agreed with the treatment plan.  Please note that this note was created using voice recognition speech to text software. Every effort has been made to correct obvious errors.  However, I expect there are errors of grammar and possibly context that were not discovered prior to finalizing the note  RAE Sagastume PA-C

## 2019-12-18 NOTE — PATIENT INSTRUCTIONS
"Influenza A (H1N1)  H1N1 formerly called \"swine flu\" is a new influenza virus causing sickness in people. The H1N1 virus is different from seasonal influenza viruses. However, the H1N1 symptoms are similar to seasonal influenza and it is spread from person to person. You may be at higher risk for serious problems if you have underlying serious medical conditions. The CDC and the World Health Organization are following reported cases around the world.  CAUSES   · The flu is thought to spread mainly person-to-person through coughing or sneezing of infected people.  · A person may become infected by touching something with the virus on it and then touching their mouth or nose.  SYMPTOMS   · Fever.  · Headache.  · Tiredness.  · Cough.  · Sore throat.  · Runny or stuffy nose.  · Body aches.  · Diarrhea and vomiting  These symptoms are referred to as \"flu-like symptoms.\" A lot of different illnesses, including the common cold, may have similar symptoms.  DIAGNOSIS   · There are tests that can tell if you have the H1N1 virus.  · Confirmed cases of H1N1 will be reported to the state or local health department.  · A doctor's exam may be needed to tell whether you have an infection that is a complication of the flu.  HOME CARE INSTRUCTIONS   · Stay informed. Visit the CDC website for current recommendations. Visit www.cdc.gov/S4A0pat/. You may also call 2-211-JTD-INFO (1-424.132.8327).  · Get help early if you develop any of the above symptoms.  · If you are at high risk from complications of the flu, talk to your caregiver as soon as you develop flu-like symptoms. Those at higher risk for complications include:  · People 65 years or older.  · People with chronic medical conditions.  · Pregnant women.  · Young children.  · Your caregiver may recommend antiviral medicine to help treat the flu.  · If you get the flu, get plenty of rest, drink enough water and fluids to keep your urine clear or pale yellow, and avoid using " alcohol or tobacco.  · You may take over-the-counter medicine to relieve the symptoms of the flu if your caregiver approves. (Never give aspirin to children or teenagers who have flu-like symptoms, particularly fever).  TREATMENT   If you do get sick, antiviral drugs are available. These drugs can make your illness milder and make you feel better faster. Treatment should start soon after illness starts. It is only effective if taken within the first day of becoming ill. Only your caregiver can prescribe antiviral medication.   PREVENTION   · Cover your nose and mouth with a tissue or your arm when you cough or sneeze. Throw the tissue away.  · Wash your hands often with soap and warm water, especially after you cough or sneeze. Alcohol-based  are also effective against germs.  · Avoid touching your eyes, nose or mouth. This is one way germs spread.  · Try to avoid contact with sick people. Follow public health advice regarding school closures. Avoid crowds.  · Stay home if you get sick. Limit contact with others to keep from infecting them. People infected with the H1N1 virus may be able to infect others anywhere from 1 day before feeling sick to 5-7 days after getting flu symptoms.  · An H1N1 vaccine is available to help protect against the virus. In addition to the H1N1 vaccine, you will need to be vaccinated for seasonal influenza. The H1N1 and seasonal vaccines may be given on the same day. The CDC especially recommends the H1N1 vaccine for:  · Pregnant women.  · People who live with or care for children younger than 6 months of age.  · Health care and emergency services personnel.  · Persons between the ages of 6 months through 24 years of age.  · People from ages 25 through 64 years who are at higher risk for H1N1 because of chronic health disorders or immune system problems.  FACEMASKS  In community and home settings, the use of facemasks and N95 respirators are not normally recommended. In certain  circumstances, a facemask or N95 respirator may be used for persons at increased risk of severe illness from influenza. Your caregiver can give additional recommendations for facemask use.  IN CHILDREN, EMERGENCY WARNING SIGNS THAT NEED URGENT MEDICAL CARE:  · Fast breathing or trouble breathing.  · Bluish skin color.  · Not drinking enough fluids.  · Not waking up or not interacting normally.  · Being so fussy that the child does not want to be held.  · Your child has an oral temperature above 102° F (38.9° C), not controlled by medicine.  · Your baby is older than 3 months with a rectal temperature of 102° F (38.9° C) or higher.  · Your baby is 3 months old or younger with a rectal temperature of 100.4° F (38° C) or higher.  · Flu-like symptoms improve but then return with fever and worse cough.  IN ADULTS, EMERGENCY WARNING SIGNS THAT NEED URGENT MEDICAL CARE:  · Difficulty breathing or shortness of breath.  · Pain or pressure in the chest or abdomen.  · Sudden dizziness.  · Confusion.  · Severe or persistent vomiting.  · Bluish color.  · You have a oral temperature above 102° F (38.9° C), not controlled by medicine.  · Flu-like symptoms improve but return with fever and worse cough.  SEEK IMMEDIATE MEDICAL CARE IF:   You or someone you know is experiencing any of the above symptoms. When you arrive at the emergency center, report that you think you have the flu. You may be asked to wear a mask and/or sit in a secluded area to protect others from getting sick.  MAKE SURE YOU:   · Understand these instructions.  · Will watch your condition.  · Will get help right away if you are not doing well or get worse.  Some of this information courtesy of the CDC.   Document Released: 06/05/2009 Document Revised: 03/11/2013 Document Reviewed: 06/05/2009  [employer]® Patient Information ©2014 [employer].

## 2022-06-21 ENCOUNTER — HOSPITAL ENCOUNTER (EMERGENCY)
Facility: MEDICAL CENTER | Age: 22
End: 2022-06-21
Attending: EMERGENCY MEDICINE
Payer: COMMERCIAL

## 2022-06-21 VITALS
DIASTOLIC BLOOD PRESSURE: 79 MMHG | SYSTOLIC BLOOD PRESSURE: 117 MMHG | BODY MASS INDEX: 27.37 KG/M2 | HEART RATE: 84 BPM | HEIGHT: 67 IN | RESPIRATION RATE: 16 BRPM | TEMPERATURE: 97.6 F | WEIGHT: 174.38 LBS | OXYGEN SATURATION: 95 %

## 2022-06-21 DIAGNOSIS — S61.411A LACERATION OF RIGHT HAND, FOREIGN BODY PRESENCE UNSPECIFIED, INITIAL ENCOUNTER: ICD-10-CM

## 2022-06-21 PROCEDURE — 304999 HCHG REPAIR-SIMPLE/INTERMED LEVEL 1

## 2022-06-21 PROCEDURE — 304217 HCHG IRRIGATION SYSTEM

## 2022-06-21 PROCEDURE — 99282 EMERGENCY DEPT VISIT SF MDM: CPT

## 2022-06-21 PROCEDURE — 303353 HCHG DERMABOND SKIN ADHESIVE

## 2022-06-21 ASSESSMENT — ENCOUNTER SYMPTOMS
SENSORY CHANGE: 0
ROS SKIN COMMENTS: POSITIVE FOR LACERATION

## 2022-06-22 NOTE — ED TRIAGE NOTES
"Chief Complaint   Patient presents with   • Hand Laceration     Pt was cleaning a glass when it broke slicing between his pinky and fourth finger      Pulse 84   Temp 36.4 °C (97.6 °F) (Temporal)   Resp 16   Ht 1.702 m (5' 7\")   Wt 79.1 kg (174 lb 6.1 oz)   SpO2 95%   BMI 27.31 kg/m²     "

## 2022-06-22 NOTE — ED PROVIDER NOTES
ED Provider Note      Primary care provider: Addis Shelton P.A.-C.  Means of arrival: private ehicle  History obtained from: patient  History limited by: none    CHIEF COMPLAINT  Chief Complaint   Patient presents with   • Hand Laceration     Pt was cleaning a glass when it broke slicing between his pinky and fourth finger        HPI  Paxton Gómez is a 22 y.o. male who presents to the Emergency Department of hand laceration.  Patient reports that he was doing dishes when a broken dish cut the area on his right hand between his fourth and fifth digits.  He denies any other injuries.  He denies numbness, tingling or weakness.  His tetanus is up-to-date.    REVIEW OF SYSTEMS  Review of Systems   Skin:        Positive for laceration   Neurological: Negative for sensory change.         PAST MEDICAL HISTORY   has a past medical history of Left sided sciatica (6/25/2018), NEGATIVE HISTORY OF, and Plantar wart (6/27/2018).    SURGICAL HISTORY  patient denies any surgical history    SOCIAL HISTORY  Social History     Tobacco Use   • Smoking status: Never Smoker   • Smokeless tobacco: Never Used   Vaping Use   • Vaping Use: Never used   Substance Use Topics   • Alcohol use: Yes     Alcohol/week: 4.2 oz     Types: 7 Cans of beer per week     Comment: a beer a day   • Drug use: No      Social History     Substance and Sexual Activity   Drug Use No       FAMILY HISTORY  Family History   Problem Relation Age of Onset   • Diabetes Father    • Diabetes Maternal Grandmother    • Diabetes Maternal Grandfather    • Diabetes Paternal Grandmother    • Diabetes Paternal Grandfather    • Genitourinary () Problems Neg Hx    • Cancer Neg Hx    • Stroke Neg Hx    • Heart Disease Neg Hx        CURRENT MEDICATIONS  Home Medications    **Home medications have not yet been reviewed for this encounter**         ALLERGIES  Allergies   Allergen Reactions   • Cyclobenzaprine Swelling     Pt reports that it made his face swell  "      PHYSICAL EXAM  VITAL SIGNS: /79   Pulse 84   Temp 36.4 °C (97.6 °F) (Temporal)   Resp 16   Ht 1.702 m (5' 7\")   Wt 79.1 kg (174 lb 6.1 oz)   SpO2 95%   BMI 27.31 kg/m²   Vitals reviewed by myself.  Physical Exam  Nursing note and vitals reviewed.  Constitutional: Well-developed and well-nourished.   HENT: Head is normocephalic and atraumatic.  Eyes: extra-ocular movements intact  Cardiovascular: regular rate and  regular rhythm.   Pulmonary/Chest: Breath sounds normal. No wheezes or rales.     Musculoskeletal: Extremities exhibit normal range of motion without edema or tenderness.   Neurological: Awake and alert, sensation intact in all distal fingertips  Skin: Skin is warm and dry. 1 cm laceration between the right interdigital space between the fourth and fifth digits.        DIAGNOSTIC STUDIES /  LABS      PROCEDURES  Laceration Repair Procedure Note    Indication: Laceration    Procedure: The patient was placed in the appropriate position and anesthesia around the laceration was not indicated. The area was then irrigated with water. The laceration was closed with Dermabond. There were no additional lacerations requiring repair. The wound area was then dressed with a bandage.      Total repaired wound length: 1 cm.     Other Items: None    The patient tolerated the procedure well.    Complications: None          COURSE & MEDICAL DECISION MAKING  Nursing notes, VS, PMSFHx reviewed in chart.    Patient is a 22-year-old male who comes in for evaluation of laceration.  On physical exam he is well-appearing with vitals in normal limits.  He is neurovascularly intact.  Laceration is superficial with no deep structure injury.  Laceration was thoroughly irrigated and repaired with Dermabond.  Patient is given wound care instructions and discharged in stable condition.      FINAL IMPRESSION  1. Laceration of right hand, foreign body presence unspecified, initial encounter                "

## 2023-06-20 ENCOUNTER — OFFICE VISIT (OUTPATIENT)
Dept: URGENT CARE | Facility: CLINIC | Age: 23
End: 2023-06-20
Payer: COMMERCIAL

## 2023-06-20 VITALS
DIASTOLIC BLOOD PRESSURE: 76 MMHG | OXYGEN SATURATION: 98 % | TEMPERATURE: 97.4 F | WEIGHT: 170 LBS | BODY MASS INDEX: 26.63 KG/M2 | SYSTOLIC BLOOD PRESSURE: 102 MMHG | HEART RATE: 63 BPM

## 2023-06-20 DIAGNOSIS — S20.212A CONTUSION OF LEFT CHEST WALL, INITIAL ENCOUNTER: ICD-10-CM

## 2023-06-20 PROCEDURE — 3078F DIAST BP <80 MM HG: CPT | Performed by: NURSE PRACTITIONER

## 2023-06-20 PROCEDURE — 3074F SYST BP LT 130 MM HG: CPT | Performed by: NURSE PRACTITIONER

## 2023-06-20 PROCEDURE — 99203 OFFICE O/P NEW LOW 30 MIN: CPT | Performed by: NURSE PRACTITIONER

## 2023-06-20 ASSESSMENT — ENCOUNTER SYMPTOMS: BACK PAIN: 1

## 2023-06-20 NOTE — PROGRESS NOTES
Subjective     Paxton Gómez is a 23 y.o. male who presents with Back Pain (Patient presents for low to mid back pain after diving for a volleyball a couple of days ago.)            Back Pain  This is a new problem. Episode onset: pt reports he was playing volleyball 4 days ago when he dove for the ball and landed hard on his left side, mid back. endorses the discomfort is improving but he is requesting work restrictions as his job is very physical. The pain is present in the thoracic spine (left chest wall). He has tried ice, NSAIDs and heat for the symptoms. The treatment provided mild relief.       Review of Systems   Musculoskeletal:  Positive for back pain.   All other systems reviewed and are negative.         Past Medical History:   Diagnosis Date    Left sided sciatica 6/25/2018    NEGATIVE HISTORY OF     Plantar wart 6/27/2018    History reviewed. No pertinent surgical history.   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     Will be playing soccer.     Gets good grades, mostly A's and B's.              Social Determinants of Health     Financial Resource Strain: Not on file   Food Insecurity: Not on file   Transportation Needs: Not on file   Physical Activity: Not on file   Stress: Not on file   Social Connections: Not on file   Intimate Partner Violence: Not on file   Housing Stability: Not on file          Objective     /76 (BP Location: Left arm, Patient Position: Sitting, BP Cuff Size: Adult)   Pulse 63   Temp 36.3 °C (97.4 °F) (Temporal)   Wt 77.1 kg (170 lb)   SpO2 98%   BMI 26.63 kg/m²      Physical Exam  Vitals and nursing note reviewed.   Constitutional:       Appearance: Normal appearance.   HENT:      Head: Normocephalic and atraumatic.      Nose: Nose normal.      Mouth/Throat:      Mouth: Mucous membranes are moist.      Pharynx: Oropharynx is clear.   Eyes:      Extraocular Movements: Extraocular movements intact.      Pupils: Pupils are equal, round, and reactive to light.   Cardiovascular:      Rate and Rhythm: Normal rate and regular rhythm.   Pulmonary:      Effort: Pulmonary effort is normal.       Musculoskeletal:         General: Normal range of motion.      Cervical back: Normal range of motion and neck supple.   Skin:     General: Skin is warm and dry.      Capillary Refill: Capillary refill takes less than 2 seconds.   Neurological:      General: No focal deficit present.      Mental Status: He is alert and oriented to person, place, and time. Mental status is at baseline.   Psychiatric:         Mood and Affect: Mood normal.         Thought Content: Thought content normal.         Judgment: Judgment normal.                             Assessment & Plan        1. Contusion of left chest wall, initial encounter    Continue ice, heat, tylenol and ibuprofen  Work note provided with restrictions for one week  Supportive care, differential diagnoses, and indications for immediate follow-up discussed with patient.    Pathogenesis of diagnosis discussed including typical length and natural progression.    Instructed to return to  or nearest emergency department if symptoms fail to improve, for any change in condition, further concerns, or new concerning symptoms.  Patient states understanding of the plan of care and discharge instructions.

## 2023-06-20 NOTE — LETTER
2023    To Whom It May Concern:         This is confirmation that Paxton Gómez attended his scheduled appointment with JAVIER Jin on 23.    Please allow him to participate in light duty while at work from 23-23. He cannot lift greater than 50 lbs and cannot push heavy objects. These restrictions  on 23 and he will return to full duty without restriction at that time. He will not be provided with another note releasing him to full duty.    Sincerely,      RIKKI Jin.  [phone]

## 2023-06-29 ENCOUNTER — APPOINTMENT (OUTPATIENT)
Dept: URGENT CARE | Facility: CLINIC | Age: 23
End: 2023-06-29

## 2023-06-29 ENCOUNTER — OFFICE VISIT (OUTPATIENT)
Dept: URGENT CARE | Facility: CLINIC | Age: 23
End: 2023-06-29
Payer: COMMERCIAL

## 2023-06-29 VITALS
SYSTOLIC BLOOD PRESSURE: 110 MMHG | TEMPERATURE: 98 F | OXYGEN SATURATION: 96 % | RESPIRATION RATE: 16 BRPM | HEART RATE: 78 BPM | DIASTOLIC BLOOD PRESSURE: 84 MMHG | BODY MASS INDEX: 27.32 KG/M2 | HEIGHT: 66 IN | WEIGHT: 170 LBS

## 2023-06-29 DIAGNOSIS — S39.012A STRAIN OF LUMBAR REGION, INITIAL ENCOUNTER: ICD-10-CM

## 2023-06-29 PROCEDURE — 99213 OFFICE O/P EST LOW 20 MIN: CPT | Performed by: PHYSICIAN ASSISTANT

## 2023-06-29 PROCEDURE — 3074F SYST BP LT 130 MM HG: CPT | Performed by: PHYSICIAN ASSISTANT

## 2023-06-29 PROCEDURE — 3079F DIAST BP 80-89 MM HG: CPT | Performed by: PHYSICIAN ASSISTANT

## 2023-06-29 RX ORDER — LIDOCAINE 4 G/G
PATCH TOPICAL
Qty: 10 PATCH | Refills: 0 | Status: SHIPPED | OUTPATIENT
Start: 2023-06-29 | End: 2023-07-19

## 2023-06-29 ASSESSMENT — ENCOUNTER SYMPTOMS
NECK PAIN: 0
BACK PAIN: 1
WEAKNESS: 0

## 2023-06-29 NOTE — LETTER
June 29, 2023         Patient: Paxton Gómez   YOB: 2000   Date of Visit: 6/29/2023           To Whom it May Concern:    Paxton Gómez was seen in my clinic on 6/29/2023.  Please allow patient to perform lighter duty. No lifting over 50 pounds for the next 2 weeks.    If you have any questions or concerns, please don't hesitate to call.        Sincerely,           Hermes Holliday P.A.-C.  Electronically Signed

## 2023-06-29 NOTE — PROGRESS NOTES
Subjective:   Paxton Gómez is a 23 y.o. male who presents today with   Chief Complaint   Patient presents with    Back Injury     X1 week, muscle pain lower back area        Back Pain  This is a new problem. The current episode started in the past 7 days. The problem occurs constantly. The problem is unchanged. The pain is present in the lumbar spine and thoracic spine. The quality of the pain is described as cramping. The pain does not radiate. Pertinent negatives include no dysuria or weakness.     Patient denies any specific injury or trauma.  Has had previous work-related back injuries but states this is not work-related.  He states that he would like to have some restrictions with work but would still like to be active.    PMH:  has a past medical history of Left sided sciatica (6/25/2018), NEGATIVE HISTORY OF, and Plantar wart (6/27/2018).    He has no past medical history of Asthma, Cancer ([facility]), Diabetes ([facility]), Hyperlipidemia, or Hypertension.  MEDS:   Current Outpatient Medications:     Lidocaine 4 % Patch, Apply patch as needed per 's instructions., Disp: 10 Patch, Rfl: 0    benzonatate (TESSALON) 100 MG Cap, Take 2 Caps by mouth 3 times a day as needed. (Patient not taking: Reported on 6/20/2023), Disp: 60 Cap, Rfl: 0    diclofenac EC (VOLTAREN) 75 MG Tablet Delayed Response, Take 1 Tab by mouth 2 times a day. (Patient not taking: Reported on 8/7/2019), Disp: 60 Tab, Rfl: 0    MethylPREDNISolone (MEDROL DOSEPAK) 4 MG Tablet Therapy Pack, Use as directed (Patient not taking: Reported on 1/21/2019), Disp: 21 Tab, Rfl: 0    amoxicillin (AMOXIL) 500 MG Cap, Take 500 mg by mouth 4 times a day. 7 day course (Patient not taking: Reported on 6/20/2023), Disp: , Rfl:   ALLERGIES:   Allergies   Allergen Reactions    Cyclobenzaprine Swelling     Pt reports that it made his face swell     SURGHX: No past surgical history on file.  SOCHX:  reports that he has never smoked. He has never used  "smokeless tobacco. He reports current alcohol use of about 4.2 oz of alcohol per week. He reports that he does not use drugs.  FH: Reviewed with patient, not pertinent to this visit.       Review of Systems   Genitourinary:  Negative for dysuria, frequency and urgency.   Musculoskeletal:  Positive for back pain. Negative for neck pain.   Neurological:  Negative for weakness.        Objective:   /84   Pulse 78   Temp 36.7 °C (98 °F) (Temporal)   Resp 16   Ht 1.676 m (5' 6\")   Wt 77.1 kg (170 lb)   SpO2 96%   BMI 27.44 kg/m²   Physical Exam  Vitals and nursing note reviewed.   Constitutional:       General: He is not in acute distress.     Appearance: Normal appearance. He is well-developed. He is not ill-appearing or toxic-appearing.   HENT:      Head: Normocephalic and atraumatic.      Right Ear: Hearing normal.      Left Ear: Hearing normal.   Cardiovascular:      Rate and Rhythm: Normal rate.   Pulmonary:      Effort: Pulmonary effort is normal.   Musculoskeletal:        Back:       Comments: Tenderness to palpation to the thoracolumbar paraspinous area.  Normal movement of all 4 extremities.  No midline spinous process tenderness or step-off deformity noted.  Patient has limited forward flexion and twisting at the hips.  Normal gait.   Skin:     General: Skin is warm and dry.   Neurological:      Mental Status: He is alert.      Coordination: Coordination normal.   Psychiatric:         Mood and Affect: Mood normal.         Assessment/Plan:   Assessment    1. Strain of lumbar region, initial encounter  - Lidocaine 4 % Patch; Apply patch as needed per 's instructions.  Dispense: 10 Patch; Refill: 0    Symptoms and presentation appear consistent with strain of the paraspinous area of the lumbar back.  No midline spinous process tenderness.  No acute concerning red flag signs on exam today.  Offered Toradol shot but patient declines.  Recommend gentle stretching and range of motion exercises. "  No indication for imaging at this time  Patient states he is comfortable with no heavy lifting over 50 pounds and would like a note for this type of duty.  Again he states this is not work-related when asked.    Differential diagnosis, natural history, supportive care, and indications for immediate follow-up discussed.   Patient given instructions and understanding of medications and treatment.    If not improving in 3-5 days, F/U with PCP or return to  if symptoms worsen.    Patient agreeable to plan.    Please note that this dictation was created using voice recognition software. I have made every reasonable attempt to correct obvious errors, but I expect that there are errors of grammar and possibly content that I did not discover before finalizing the note.    Hermes Holliday PA-C

## 2023-07-13 ENCOUNTER — APPOINTMENT (OUTPATIENT)
Dept: URGENT CARE | Facility: CLINIC | Age: 23
End: 2023-07-13

## 2023-07-13 ENCOUNTER — OFFICE VISIT (OUTPATIENT)
Dept: URGENT CARE | Facility: CLINIC | Age: 23
End: 2023-07-13
Payer: COMMERCIAL

## 2023-07-13 VITALS
SYSTOLIC BLOOD PRESSURE: 120 MMHG | DIASTOLIC BLOOD PRESSURE: 60 MMHG | WEIGHT: 167 LBS | RESPIRATION RATE: 14 BRPM | OXYGEN SATURATION: 96 % | TEMPERATURE: 97.5 F | HEART RATE: 74 BPM | HEIGHT: 67 IN | BODY MASS INDEX: 26.21 KG/M2

## 2023-07-13 DIAGNOSIS — Z76.89 REFERRAL OF PATIENT: ICD-10-CM

## 2023-07-13 DIAGNOSIS — B35.0 TINEA CAPITIS: ICD-10-CM

## 2023-07-13 DIAGNOSIS — S39.012D STRAIN OF LUMBAR REGION, SUBSEQUENT ENCOUNTER: ICD-10-CM

## 2023-07-13 PROCEDURE — 3078F DIAST BP <80 MM HG: CPT | Performed by: STUDENT IN AN ORGANIZED HEALTH CARE EDUCATION/TRAINING PROGRAM

## 2023-07-13 PROCEDURE — 3074F SYST BP LT 130 MM HG: CPT | Performed by: STUDENT IN AN ORGANIZED HEALTH CARE EDUCATION/TRAINING PROGRAM

## 2023-07-13 PROCEDURE — 99214 OFFICE O/P EST MOD 30 MIN: CPT | Performed by: STUDENT IN AN ORGANIZED HEALTH CARE EDUCATION/TRAINING PROGRAM

## 2023-07-13 RX ORDER — TERBINAFINE HYDROCHLORIDE 250 MG/1
250 TABLET ORAL DAILY
Qty: 14 TABLET | Refills: 0 | Status: SHIPPED | OUTPATIENT
Start: 2023-07-13 | End: 2023-07-27

## 2023-07-13 ASSESSMENT — ENCOUNTER SYMPTOMS
TINGLING: 0
BOWEL INCONTINENCE: 0
LEG PAIN: 0
ABDOMINAL PAIN: 0
CHILLS: 0
PALPITATIONS: 0
SHORTNESS OF BREATH: 0
PARESTHESIAS: 0
BACK PAIN: 1
PERIANAL NUMBNESS: 0
WHEEZING: 0
DIARRHEA: 0
HEADACHES: 0
PARESIS: 0
COUGH: 0
CONSTIPATION: 0
NUMBNESS: 0
NAUSEA: 0
FEVER: 0
WEAKNESS: 0
VOMITING: 0

## 2023-07-13 NOTE — PROGRESS NOTES
Subjective     Paxton Gómez is a 23 y.o. male who presents with Back Pain (X 3 weeks, lower back pain due to injury while playing volleyball. Needs work note.) and Rash ( Bald round  spot on scalp.)            Paxton is a 23 y.o. male who presents to urgent care for low back pain.  Patient states low back pain started approximately 3 weeks ago after injury playing volleyball.  Patient was initially seen in urgent care and was given work restrictions.  This is a nonwork related injury and patient was given work restrictions which included no lifting 50 pounds or heavier.  Patient states that he feels like his back has been gradually improving and reports 90% improvement.  Patient did return to work without restrictions and felt like he tweaked his back.  Patient requesting a work note to extend work restrictions to give him more time to recover.  Patient also brings up concerns of rash on his scalp.  Patient states that he has loss of hair and circular distribution and concern for alopecia.  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    Back Pain  This is a recurrent problem. The current episode started 1 to 4 weeks ago. The problem has been gradually improving since onset. The pain is present in the lumbar spine. The pain does not radiate. The pain is mild. Pertinent negatives include no abdominal pain, bladder incontinence, bowel incontinence, chest pain, dysuria, fever, headaches, leg pain, numbness, paresis, paresthesias, pelvic pain, perianal numbness, tingling or weakness.   Rash  This is a new problem. The problem is unchanged. The affected locations include the scalp. The rash is characterized by dryness and peeling (loss of hair). He was exposed to nothing. Pertinent negatives include no cough, diarrhea, fever, shortness of breath or vomiting.       Review of Systems   Constitutional:  Negative for chills, fever and malaise/fatigue.   Respiratory:   "Negative for cough, shortness of breath and wheezing.    Cardiovascular:  Negative for chest pain and palpitations.   Gastrointestinal:  Negative for abdominal pain, bowel incontinence, constipation, diarrhea, nausea and vomiting.   Genitourinary:  Negative for bladder incontinence, dysuria and pelvic pain.   Musculoskeletal:  Positive for back pain.   Skin:  Positive for rash.   Neurological:  Negative for tingling, weakness, numbness, headaches and paresthesias.   All other systems reviewed and are negative.             Objective     /60   Pulse 74   Temp 36.4 °C (97.5 °F) (Temporal)   Resp 14   Ht 1.702 m (5' 7\")   Wt 75.8 kg (167 lb)   SpO2 96%   BMI 26.16 kg/m²      Physical Exam  Vitals reviewed.   Constitutional:       General: He is not in acute distress.     Appearance: Normal appearance. He is not ill-appearing, toxic-appearing or diaphoretic.   HENT:      Head: Normocephalic and atraumatic.      Mouth/Throat:      Mouth: Mucous membranes are moist.      Pharynx: Oropharynx is clear.   Eyes:      Extraocular Movements: Extraocular movements intact.      Conjunctiva/sclera: Conjunctivae normal.      Pupils: Pupils are equal, round, and reactive to light.   Cardiovascular:      Rate and Rhythm: Normal rate.   Pulmonary:      Effort: Pulmonary effort is normal.   Musculoskeletal:      Cervical back: Normal.      Thoracic back: Normal.      Lumbar back: Spasms and tenderness present. No swelling or bony tenderness. Decreased range of motion.   Skin:     General: Skin is warm and dry.      Comments: Circular, scaly plaque with associated hair loss is present on the scalp.     Neurological:      General: No focal deficit present.      Mental Status: He is alert. Mental status is at baseline.      Gait: Gait is intact.                             Assessment & Plan        1. Strain of lumbar region, subsequent encounter  - Work note provided per request.  - Declined Toradol and prescription for " flexeril.  - Supportive care measures discussed including rest, heat, OTC NSAIDs, gentle range of motion/stretching.  - Referral to Sports Medicine    2. Referral of patient  - Referral to establish with [facility] PCP    3. Tinea capitis  - terbinafine (LAMISIL) 250 MG Tab; Take 1 Tablet by mouth every day for 14 days.  Dispense: 14 Tablet; Refill: 0     I personally reviewed prior external notes and test results pertinent to today's visit, including direct urgent care visit on 6/29/2023.    Differential diagnoses, supportive care measures and indications for immediate follow-up discussed with patient.   Pathogenesis of diagnosis discussed including typical length and natural progression.      My total time spent caring for the patient on the day of the encounter was 30 minutes including reviewing prior external/test results, obtaining patient history, physical exam, discussing differential diagnosis, plan of care, supportive care measures, appropriate follow-up and indications for immediate follow-up. This does not include time spent on separately billable procedures/tests.     Instructed to return to urgent care or nearest emergency department if symptoms fail to improve, for any change in condition, further concerns, or new concerning symptoms.    Patient states understanding and agrees with the plan of care and discharge instructions.

## 2023-07-13 NOTE — LETTER
July 13, 2023    To Whom It May Concern:         This is confirmation that Paxton Gómez attended his scheduled appointment with Chika Stephens P.A.-C. on 7/13/23. Recommend no lift, pushing or pulling greater than 50 lbs.         If you have any questions please do not hesitate to call me at the phone number listed below.    Sincerely,    Chika Stephens P.A.-C.  [phone]

## 2023-07-19 ENCOUNTER — OFFICE VISIT (OUTPATIENT)
Dept: MEDICAL GROUP | Facility: MEDICAL CENTER | Age: 23
End: 2023-07-19
Attending: STUDENT IN AN ORGANIZED HEALTH CARE EDUCATION/TRAINING PROGRAM
Payer: COMMERCIAL

## 2023-07-19 VITALS
OXYGEN SATURATION: 96 % | HEIGHT: 67 IN | WEIGHT: 170.75 LBS | DIASTOLIC BLOOD PRESSURE: 64 MMHG | SYSTOLIC BLOOD PRESSURE: 118 MMHG | RESPIRATION RATE: 16 BRPM | HEART RATE: 68 BPM | BODY MASS INDEX: 26.8 KG/M2 | TEMPERATURE: 98 F

## 2023-07-19 DIAGNOSIS — Z11.59 NEED FOR HEPATITIS C SCREENING TEST: ICD-10-CM

## 2023-07-19 DIAGNOSIS — Z00.00 PE (PHYSICAL EXAM), ANNUAL: ICD-10-CM

## 2023-07-19 DIAGNOSIS — Z23 NEED FOR VACCINATION: ICD-10-CM

## 2023-07-19 DIAGNOSIS — S33.5XXS LUMBAR BACK SPRAIN, SEQUELA: ICD-10-CM

## 2023-07-19 PROCEDURE — 90471 IMMUNIZATION ADMIN: CPT | Performed by: NURSE PRACTITIONER

## 2023-07-19 PROCEDURE — 99212 OFFICE O/P EST SF 10 MIN: CPT | Mod: 25 | Performed by: NURSE PRACTITIONER

## 2023-07-19 PROCEDURE — 90715 TDAP VACCINE 7 YRS/> IM: CPT | Performed by: NURSE PRACTITIONER

## 2023-07-19 PROCEDURE — 3078F DIAST BP <80 MM HG: CPT | Performed by: NURSE PRACTITIONER

## 2023-07-19 PROCEDURE — 3074F SYST BP LT 130 MM HG: CPT | Performed by: NURSE PRACTITIONER

## 2023-07-19 ASSESSMENT — PATIENT HEALTH QUESTIONNAIRE - PHQ9: CLINICAL INTERPRETATION OF PHQ2 SCORE: 0

## 2023-07-19 NOTE — LETTER
July 19, 2023       Patient: Paxton Gómez   YOB: 2000   Date of Visit: 7/19/2023         To Whom It May Concern:    My patient, Paxton Gómez, was seen and examined today.  He can return to full duty, no restrictions.    If you have any questions or concerns, please don't hesitate to call [phone]          Sincerely,          JAVIER Stephens  Electronically Signed

## 2023-07-19 NOTE — PROGRESS NOTES
Chief Complaint   Patient presents with    Establish Care       HISTORY OF PRESENT ILLNESS: Patient is a 23 y.o. male, established patient who presents today to discuss medical problems as listed below:    Health Maintenance:  COMPLETED       Allergies: Cyclobenzaprine    Current Outpatient Medications   Medication Sig Dispense Refill    terbinafine (LAMISIL) 250 MG Tab Take 1 Tablet by mouth every day for 14 days. 14 Tablet 0     No current facility-administered medications for this visit.       Allergies, past medical history, past surgical history, family history, social history reviewed and updated.    Review of Systems:     - Constitutional: Negative for fever, chills, unexpected weight change, and fatigue/generalized weakness.     - HEENT: Negative for headaches, vision changes, hearing changes, ear pain, ear discharge, rhinorrhea, sinus congestion, sore throat, and neck pain.      - Respiratory: Negative for cough, sputum production, chest congestion, dyspnea, wheezing, and crackles.      - Cardiovascular: Negative for chest pain, palpitations, orthopnea, and bilateral lower extremity edema.     - Gastrointestinal: Negative for heartburn, nausea, vomiting, abdominal pain, hematochezia, melena, diarrhea, constipation, and greasy/foul-smelling stools.     - Genitourinary: Negative for dysuria, polyuria, hematuria, pyuria, urinary urgency, and urinary incontinence.    - Musculoskeletal: Negative for myalgias, back pain, and joint pain.     - Skin: Negative for rash, itching, cyanotic skin color change.     - Neurological: Negative for dizziness, tingling, tremors, focal sensory deficit, focal weakness and headaches.     - Endo/Heme/Allergies: Does not bruise/bleed easily.     - Psychiatric/Behavioral: Negative for depression, suicidal/homicidal ideation and memory loss.      All other systems reviewed and are negative    Exam:    /64   Pulse 68   Temp 36.7 °C (98 °F) (Temporal)   Resp 16   Ht 1.702 m  "(5' 7\")   Wt 77.4 kg (170 lb 11.9 oz)   SpO2 96%   BMI 26.74 kg/m²  Body mass index is 26.74 kg/m².    Physical Exam:  Constitutional: Well-developed and well-nourished. Not diaphoretic. No distress.   Skin: Skin is warm and dry. No rash noted.  Head: Atraumatic without lesions.  Eyes: Conjunctivae and extraocular motions are normal. Pupils are equal, round, and reactive to light. No scleral icterus.   Ears:  External ears unremarkable. Tympanic membranes clear and intact.  Nose: Nares patent. Septum midline. Turbinates without erythema nor edema. No discharge.   Mouth/Throat: Dentition is normal. Tongue normal. Oropharynx is clear and moist. Posterior pharynx without erythema or exudates.  Neck: Supple, trachea midline. Normal range of motion. No thyromegaly present. No lymphadenopathy--cervical or supraclavicular.  Cardiovascular: Regular rate and rhythm, S1 and S2 without murmur, rubs, or gallops.    Chest: Effort normal. Clear to auscultation throughout. No adventitious sounds. No CVA tenderness.  Abdomen: Soft, non tender, and without distention. Active bowel sounds in all four quadrants. No rebound, guarding, masses or HSM.  : Negative for dysuria, polyuria, hematuria, pyuria, urinary urgency, and urinary incontinence.  Extremities: No cyanosis, clubbing, erythema, nor edema. Distal pulses intact and symmetric.   Musculoskeletal: All major joints AROM full in all directions without pain.  Neurological: Alert and oriented x 3. DTRs 2+/3 and symmetric. No cranial nerve deficit. 5/5 myotomes. Sensation intact. Negative Rhomberg.  Psychiatric:  Behavior, mood, and affect are appropriate.  MA/nursing note and vitals reviewed.    Assessment/Plan:  Lumbar back sprain, sequela  New to me. Pt was dx'd with lumbar strain on 7/13/23. He was seen at . Today he is feeling 100% better, no issues with ROM, no paresthesia, no sciatica. He took some time off work and now would like to return to work. 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 Utilizing back support.     1. Need for vaccination  - Tdap Vaccine =>6YO IM    2. Need for hepatitis C screening test  - HEP C VIRUS ANTIBODY; Future    3. PE (physical exam), annual  - CBC WITH DIFFERENTIAL; Future  - Comp Metabolic Panel; Future  - HEMOGLOBIN A1C; Future  - Lipid Profile; Future  - VITAMIN D,25 HYDROXY (DEFICIENCY); Future  - TSH WITH REFLEX TO FT4; Future    4. Lumbar back sprain, sequela  resolved      Discussed with patient possible alternative diagnoses, patient is to take all medications as prescribed.      If symptoms persist FU w/PCP, if symptoms worsen go to emergency room.      If experiencing any side effects from prescribed medications report to the office immediately or go to emergency room.     Reviewed indication, dosage, usage and potential adverse effects of prescribed medications.      Reviewed risks and benefits of treatment plan. Patient verbalizes understanding of all instruction and verbally agrees to plan.     Discussed plan with the patient, and patient agrees to the above.      I personally reviewed prior external notes and test results pertinent to today's visit.      No follow-ups on file. Annual, PRN

## 2023-07-19 NOTE — ASSESSMENT & PLAN NOTE
New to me. Pt was dx'd with lumbar strain on 7/13/23. He was seen at . Today he is feeling 100% better, no issues with ROM, no paresthesia, no sciatica. He took some time off work and now would like to return to work. 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 Utilizing back support.

## 2023-07-21 ENCOUNTER — TELEPHONE (OUTPATIENT)
Dept: HEALTH INFORMATION MANAGEMENT | Facility: OTHER | Age: 23
End: 2023-07-21
Payer: COMMERCIAL

## 2023-08-16 ENCOUNTER — APPOINTMENT (OUTPATIENT)
Dept: RADIOLOGY | Facility: IMAGING CENTER | Age: 23
End: 2023-08-16
Payer: COMMERCIAL

## 2023-08-16 ENCOUNTER — HOSPITAL ENCOUNTER (OUTPATIENT)
Dept: LAB | Facility: MEDICAL CENTER | Age: 23
End: 2023-08-16
Payer: COMMERCIAL

## 2023-08-16 ENCOUNTER — OFFICE VISIT (OUTPATIENT)
Dept: URGENT CARE | Facility: PHYSICIAN GROUP | Age: 23
End: 2023-08-16
Payer: COMMERCIAL

## 2023-08-16 VITALS
WEIGHT: 174 LBS | DIASTOLIC BLOOD PRESSURE: 58 MMHG | OXYGEN SATURATION: 98 % | BODY MASS INDEX: 27.31 KG/M2 | SYSTOLIC BLOOD PRESSURE: 104 MMHG | HEIGHT: 67 IN | TEMPERATURE: 97.3 F | HEART RATE: 95 BPM | RESPIRATION RATE: 20 BRPM

## 2023-08-16 DIAGNOSIS — R10.84 GENERALIZED ABDOMINAL PAIN: ICD-10-CM

## 2023-08-16 DIAGNOSIS — R14.0 BLOATING: ICD-10-CM

## 2023-08-16 LAB
ALBUMIN SERPL BCP-MCNC: 4.1 G/DL (ref 3.2–4.9)
ALBUMIN/GLOB SERPL: 1.6 G/DL
ALP SERPL-CCNC: 83 U/L (ref 30–99)
ALT SERPL-CCNC: 18 U/L (ref 2–50)
ANION GAP SERPL CALC-SCNC: 10 MMOL/L (ref 7–16)
APPEARANCE UR: CLEAR
AST SERPL-CCNC: 19 U/L (ref 12–45)
BASOPHILS # BLD AUTO: 0.6 % (ref 0–1.8)
BASOPHILS # BLD: 0.03 K/UL (ref 0–0.12)
BILIRUB SERPL-MCNC: 0.3 MG/DL (ref 0.1–1.5)
BILIRUB UR STRIP-MCNC: NEGATIVE MG/DL
BUN SERPL-MCNC: 15 MG/DL (ref 8–22)
CALCIUM ALBUM COR SERPL-MCNC: 9 MG/DL (ref 8.5–10.5)
CALCIUM SERPL-MCNC: 9.1 MG/DL (ref 8.5–10.5)
CHLORIDE SERPL-SCNC: 103 MMOL/L (ref 96–112)
CO2 SERPL-SCNC: 29 MMOL/L (ref 20–33)
COLOR UR AUTO: YELLOW
CREAT SERPL-MCNC: 1.08 MG/DL (ref 0.5–1.4)
EOSINOPHIL # BLD AUTO: 0.36 K/UL (ref 0–0.51)
EOSINOPHIL NFR BLD: 6.7 % (ref 0–6.9)
ERYTHROCYTE [DISTWIDTH] IN BLOOD BY AUTOMATED COUNT: 40 FL (ref 35.9–50)
GFR SERPLBLD CREATININE-BSD FMLA CKD-EPI: 97 ML/MIN/1.73 M 2
GLOBULIN SER CALC-MCNC: 2.5 G/DL (ref 1.9–3.5)
GLUCOSE SERPL-MCNC: 99 MG/DL (ref 65–99)
GLUCOSE UR STRIP.AUTO-MCNC: NEGATIVE MG/DL
HCT VFR BLD AUTO: 45.6 % (ref 42–52)
HGB BLD-MCNC: 15.5 G/DL (ref 14–18)
IMM GRANULOCYTES # BLD AUTO: 0.02 K/UL (ref 0–0.11)
IMM GRANULOCYTES NFR BLD AUTO: 0.4 % (ref 0–0.9)
KETONES UR STRIP.AUTO-MCNC: NEGATIVE MG/DL
LEUKOCYTE ESTERASE UR QL STRIP.AUTO: NEGATIVE
LIPASE SERPL-CCNC: 33 U/L (ref 11–82)
LYMPHOCYTES # BLD AUTO: 1.41 K/UL (ref 1–4.8)
LYMPHOCYTES NFR BLD: 26.4 % (ref 22–41)
MCH RBC QN AUTO: 31 PG (ref 27–33)
MCHC RBC AUTO-ENTMCNC: 34 G/DL (ref 32.3–36.5)
MCV RBC AUTO: 91.2 FL (ref 81.4–97.8)
MONOCYTES # BLD AUTO: 0.6 K/UL (ref 0–0.85)
MONOCYTES NFR BLD AUTO: 11.2 % (ref 0–13.4)
NEUTROPHILS # BLD AUTO: 2.93 K/UL (ref 1.82–7.42)
NEUTROPHILS NFR BLD: 54.7 % (ref 44–72)
NITRITE UR QL STRIP.AUTO: NEGATIVE
NRBC # BLD AUTO: 0 K/UL
NRBC BLD-RTO: 0 /100 WBC (ref 0–0.2)
PH UR STRIP.AUTO: 8 [PH] (ref 5–8)
PLATELET # BLD AUTO: 239 K/UL (ref 164–446)
PMV BLD AUTO: 10.8 FL (ref 9–12.9)
POTASSIUM SERPL-SCNC: 4.5 MMOL/L (ref 3.6–5.5)
PROT SERPL-MCNC: 6.6 G/DL (ref 6–8.2)
PROT UR QL STRIP: NEGATIVE MG/DL
RBC # BLD AUTO: 5 M/UL (ref 4.7–6.1)
RBC UR QL AUTO: NEGATIVE
SODIUM SERPL-SCNC: 142 MMOL/L (ref 135–145)
SP GR UR STRIP.AUTO: 1.01
UROBILINOGEN UR STRIP-MCNC: 1 MG/DL
WBC # BLD AUTO: 5.4 K/UL (ref 4.8–10.8)

## 2023-08-16 PROCEDURE — 99214 OFFICE O/P EST MOD 30 MIN: CPT

## 2023-08-16 PROCEDURE — 3074F SYST BP LT 130 MM HG: CPT

## 2023-08-16 PROCEDURE — 85025 COMPLETE CBC W/AUTO DIFF WBC: CPT

## 2023-08-16 PROCEDURE — 83690 ASSAY OF LIPASE: CPT

## 2023-08-16 PROCEDURE — 80053 COMPREHEN METABOLIC PANEL: CPT

## 2023-08-16 PROCEDURE — 74019 RADEX ABDOMEN 2 VIEWS: CPT | Mod: TC | Performed by: STUDENT IN AN ORGANIZED HEALTH CARE EDUCATION/TRAINING PROGRAM

## 2023-08-16 PROCEDURE — 81002 URINALYSIS NONAUTO W/O SCOPE: CPT

## 2023-08-16 PROCEDURE — 36415 COLL VENOUS BLD VENIPUNCTURE: CPT

## 2023-08-16 PROCEDURE — 3078F DIAST BP <80 MM HG: CPT

## 2023-08-16 NOTE — PROGRESS NOTES
Chief Complaint   Patient presents with    GI Problem      Burning feeling above belly bottom for the past 3 days. Bloated, small appetite,       HISTORY OF PRESENT ILLNESS: Patient is a pleasant 23 y.o. male who presents to urgent care today ongoing abdominal pain for the last 3 days.  Patient states he feels slightly bloated and notes a decreased appetite as well.  Denies any vomiting, positive nausea, denies any pain with urination.  He does note small loose stools.  No noted blood in his stool.  Patient denies any alcohol use, no major aspirin or Motrin use.  No previous history noted otherwise.    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     No current [facility]-ordered facility-administered medications on file.       Past Medical History:   Diagnosis Date    Left sided sciatica 6/25/2018    NEGATIVE HISTORY OF     Plantar wart 6/27/2018       Social History     Tobacco Use    Smoking status: Never    Smokeless tobacco: Never   Vaping Use    Vaping Use: Never used   Substance Use Topics    Alcohol use: Yes     Alcohol/week: 4.2 oz     Types: 7 Cans of beer per week     Comment: a beer a day    Drug use: No       Family Status   Relation Name Status    Mo  Alive    Fa  Alive    MGMo  (Not Specified)    MGFa  (Not Specified)    PGMo  (Not Specified)    PGFa  (Not Specified)    Neg Hx  (Not Specified)     Family History   Problem Relation Age of Onset    Diabetes Father     Diabetes Maternal Grandmother     Diabetes Maternal Grandfather     Diabetes Paternal Grandmother     Diabetes Paternal Grandfather     Genitourinary () Problems Neg Hx     Cancer Neg Hx     Stroke Neg Hx     Heart Disease Neg Hx        ROS:  Review of Systems   Constitutional:  "Negative for fever, chills, weight loss, malaise, and fatigue.   HENT: Negative for ear pain, nosebleeds, congestion, sore throat and neck pain.    Eyes: Negative for vision changes.   Neuro: Negative for headache, sensory changes, weakness, seizure, LOC.   Cardiovascular: Negative for chest pain, palpitations, orthopnea and leg swelling.   Respiratory: Negative for cough, sputum production, shortness of breath and wheezing.   Gastrointestinal: Positive for abdominal pain, nausea, negative vomiting  and positive.  Diarrhea.   Genitourinary: Negative for dysuria, urgency and frequency.  Musculoskeletal: Negative for falls, neck pain, back pain, joint pain, myalgias.   Skin: Negative for rash, diaphoresis.     Exam:  /58 (BP Location: Right arm, Patient Position: Sitting, BP Cuff Size: Adult)   Pulse 95   Temp 36.3 °C (97.3 °F) (Temporal)   Resp 20   Ht 1.702 m (5' 7\")   Wt 78.9 kg (174 lb)   SpO2 98%   General: well-nourished, well-developed male in NAD  Head: normocephalic, atraumatic  Eyes: PERRLA, no conjunctival injection, acuity grossly intact, lids normal.  Ears: normal shape and symmetry, no tenderness, no discharge. External canals are without any significant edema or erythema. Tympanic membranes are without any inflammation, no effusion. Gross auditory acuity is intact.  Nose: symmetrical without tenderness, no discharge.  Mouth/Throat: reasonable hygiene, no erythema, exudates or tonsillar enlargement.  Neck: no masses, range of motion within normal limits, no tracheal deviation. No obvious thyroid enlargement.   Lymph: no cervical adenopathy. No supraclavicular adenopathy.   Neuro: alert and oriented. Cranial nerves 1-12 grossly intact. No sensory deficit.   Cardiovascular: regular rate and rhythm. No edema.  Pulmonary: no distress. Chest is symmetrical with respiration, no wheezes, crackles, or rhonchi.   Abdomen: soft, tenderness noted in the right upper q negative CVA " tenderness  Musculoskeletal: no clubbing, appropriate muscle tone, gait is stable.  Skin: warm, dry, intact, no clubbing, no cyanosis, no rashes.   Psych: appropriate mood, affect, judgement.         Assessment/Plan:  1. Generalized abdominal pain  POCT Urinalysis    [id number] VIEWS    CBC WITH DIFFERENTIAL    Comp Metabolic Panel    LIPASE      2. Bloating  [id number] VIEWS      This is a 23-year-old male who comes in today with ongoing complaints of abdominal pain.  Patient states he feels slightly bloated and notes a decreased appetite as well.  Denies any vomiting, positive nausea, denies any pain with urination.  He does note small loose stools.  No noted blood in his stool.  Patient denies any alcohol use, no major aspirin or Motrin use.  No previous history noted otherwise.  On physical exam he has noted right upper quadrant tenderness, negative exam otherwise.  X-ray ordered to rule out the potential causes.  POCT urinalysis insignificant at this time.  Labs ordered to include CBC, CMP and lipase.  Patient is aware of the plan of care and agreeable at this time.  Advised to follow-up if they continue to get worse or do not improve.     Supportive care, differential diagnoses, and indications for immediate follow-up discussed with patient.   Pathogenesis of diagnosis discussed including typical length and natural progression.   Instructed to return to clinic or nearest emergency department for any change in condition, further concerns, or worsening of symptoms.  Patient states understanding of the plan of care and discharge instructions.  Instructed to make an appointment, for follow up, with primary care provider.    Please note that this dictation was created using voice recognition software. I have made every reasonable attempt to correct obvious errors, but I expect that there are errors of grammar and possibly content that I did not discover before finalizing the note.      Genie CARDONA

## 2023-08-16 NOTE — LETTER
[facility] [place]  [address]  [place] [address]     August 16, 2023    Patient: Paxton Gómez   YOB: 2000   Date of Visit: 8/16/2023       To Whom It May Concern:    Paxton Gómez was seen and treated in our department on 8/16/2023. Please excuse 08/14, 08/15 and 08/17.    Sincerely,     RIKKI Hernandez.

## 2023-08-17 ENCOUNTER — APPOINTMENT (OUTPATIENT)
Dept: MEDICAL GROUP | Facility: MEDICAL CENTER | Age: 23
End: 2023-08-17
Payer: COMMERCIAL

## 2023-08-17 DIAGNOSIS — R10.84 GENERALIZED ABDOMINAL PAIN: ICD-10-CM

## 2023-08-17 RX ORDER — OMEPRAZOLE 20 MG/1
20 CAPSULE, DELAYED RELEASE ORAL DAILY
Qty: 30 CAPSULE | Refills: 1 | Status: SHIPPED | OUTPATIENT
Start: 2023-08-17 | End: 2023-11-03

## 2023-08-17 NOTE — PROGRESS NOTES
Spoke with patient this morning on the phone.  Patient continues to have ongoing abdominal pain, he did eat several cherries last night, advised him to stop please eating cherries.  Patient was advised yesterday in the office to please eat a light diet to include bananas, apples, rice and toast.  Patient verbalized understanding.  Encouraged the use of OTC medications to include Prilosec.  Referral was placed to GI.  Patient advised that if his symptoms continue to get worse despite the use of medications and a light diet please seek further evaluation in the emergency room or with his primary care provider.

## 2023-09-22 ENCOUNTER — OFFICE VISIT (OUTPATIENT)
Dept: MEDICAL GROUP | Facility: MEDICAL CENTER | Age: 23
End: 2023-09-22
Payer: COMMERCIAL

## 2023-09-22 VITALS
SYSTOLIC BLOOD PRESSURE: 100 MMHG | HEART RATE: 90 BPM | DIASTOLIC BLOOD PRESSURE: 56 MMHG | TEMPERATURE: 98.4 F | OXYGEN SATURATION: 94 % | HEIGHT: 67 IN | WEIGHT: 180.78 LBS | BODY MASS INDEX: 28.37 KG/M2

## 2023-09-22 DIAGNOSIS — S33.5XXS LUMBAR BACK SPRAIN, SEQUELA: ICD-10-CM

## 2023-09-22 DIAGNOSIS — J34.89 NASAL OBSTRUCTION: ICD-10-CM

## 2023-09-22 DIAGNOSIS — S46.912A STRAIN OF LEFT SHOULDER, INITIAL ENCOUNTER: ICD-10-CM

## 2023-09-22 PROCEDURE — 99214 OFFICE O/P EST MOD 30 MIN: CPT | Performed by: NURSE PRACTITIONER

## 2023-09-22 PROCEDURE — 3078F DIAST BP <80 MM HG: CPT | Performed by: NURSE PRACTITIONER

## 2023-09-22 PROCEDURE — 3074F SYST BP LT 130 MM HG: CPT | Performed by: NURSE PRACTITIONER

## 2023-09-22 ASSESSMENT — FIBROSIS 4 INDEX: FIB4 SCORE: 0.47

## 2023-09-22 NOTE — ASSESSMENT & PLAN NOTE
New problem. Left shoulder pain x 6 mos. No pain with ROM. Pain happens with bench press only. Would like to proceed with PT.

## 2023-09-22 NOTE — PROGRESS NOTES
"Chief Complaint   Patient presents with    Follow-Up     Shoulder and back pain has not improved and minimal pain       HISTORY OF PRESENT ILLNESS: Patient is a 23 y.o. male, established patient who presents today to discuss medical problems as listed below:    Health Maintenance:  COMPLETED       Allergies: Cyclobenzaprine    Current Outpatient Medications   Medication Sig Dispense Refill    omeprazole (PRILOSEC) 20 MG delayed-release capsule Take 1 Capsule by mouth every day. 30 Capsule 1     No current facility-administered medications for this visit.       Allergies, past medical history, past surgical history, family history, social history reviewed and updated.    Review of Systems:     - Constitutional: Negative for fever, chills, unexpected weight change, and fatigue/generalized weakness.     - Respiratory: Negative for cough, sputum production, chest congestion, dyspnea, wheezing, and crackles.      - Cardiovascular: Negative for chest pain, palpitations, orthopnea, and bilateral lower extremity edema.      - Musculoskeletal: shoulder and back pain     - Psychiatric/Behavioral: Negative for depression, suicidal/homicidal ideation and memory loss.      All other systems reviewed and are negative    Exam:    /56 (BP Location: Left arm, Patient Position: Sitting, BP Cuff Size: Adult)   Pulse 90   Temp 36.9 °C (98.4 °F) (Temporal)   Ht 1.702 m (5' 7\")   Wt 82 kg (180 lb 12.4 oz)   SpO2 94%   BMI 28.31 kg/m²  Body mass index is 28.31 kg/m².    Physical Exam:  Constitutional: Well-developed and well-nourished. Not diaphoretic. No distress.   Cardiovascular: Regular rate and rhythm, S1 and S2 without murmur, rubs, or gallops.    Chest: Effort normal. Clear to auscultation throughout. No adventitious sounds. Musculoskeletal: All major joints AROM full in all directions without pain.  Neurological: Alert and oriented x 3.   Psychiatric:  Behavior, mood, and affect are appropriate.  MA/nursing note and " vitals reviewed.    Assessment/Plan:  Lumbar back sprain, sequela  Chronic problem. Pt reports pain in lumbar spine which first presented in July, 100 % improvement with regular activities and no ROM issues. Noted pain coming back after rest period and in August noticed pain in left thoracic spine while doing squats with weights, no pain without weight pressure.       Left shoulder strain  New problem. Left shoulder pain x 6 mos. No pain with ROM. Pain happens with bench press only. Would like to proceed with PT.    Nasal obstruction  New problem.  Patient states 1 nostril gets obstructed at a time in an upright position.  This has been a chronic issue for years.  No known trauma to the nose, no deviated septum.    1. Lumbar back sprain, sequela  Uncontrolled, stable.  Suspecting strain sprain, trial of PT for 6 weeks we will obtain imaging, we will follow-up in 6 weeks.  - DX-THORACOLUMBAR SPINE-2 VIEWS; Future  - Referral to Physical Therapy    2. Strain of left shoulder, initial encounter  As discussed in 1  - Referral to Physical Therapy    3. Nasal obstruction  - Referral to ENT      Discussed with patient possible alternative diagnoses, patient is to take all medications as prescribed.      If symptoms persist FU w/PCP, if symptoms worsen go to emergency room.      If experiencing any side effects from prescribed medications report to the office immediately or go to emergency room.     Reviewed indication, dosage, usage and potential adverse effects of prescribed medications.      Reviewed risks and benefits of treatment plan. Patient verbalizes understanding of all instruction and verbally agrees to plan.     Discussed plan with the patient, and patient agrees to the above.      I personally reviewed prior external notes and test results pertinent to today's visit.      No follow-ups on file. 6-8 wks

## 2023-09-22 NOTE — ASSESSMENT & PLAN NOTE
New problem.  Patient states 1 nostril gets obstructed at a time in an upright position.  This has been a chronic issue for years.  No known trauma to the nose, no deviated septum.

## 2023-09-22 NOTE — ASSESSMENT & PLAN NOTE
Chronic problem. Pt reports pain in lumbar spine which first presented in July, 100 % improvement with regular activities and no ROM issues. Noted pain coming back after rest period and in August noticed pain in left thoracic spine while doing squats with weights, no pain without weight pressure.

## 2023-10-05 ENCOUNTER — HOSPITAL ENCOUNTER (OUTPATIENT)
Dept: RADIOLOGY | Facility: MEDICAL CENTER | Age: 23
End: 2023-10-05
Attending: NURSE PRACTITIONER
Payer: COMMERCIAL

## 2023-10-05 DIAGNOSIS — S33.5XXS LUMBAR BACK SPRAIN, SEQUELA: ICD-10-CM

## 2023-10-05 PROCEDURE — 72080 X-RAY EXAM THORACOLMB 2/> VW: CPT

## 2023-11-03 ENCOUNTER — OFFICE VISIT (OUTPATIENT)
Dept: MEDICAL GROUP | Facility: MEDICAL CENTER | Age: 23
End: 2023-11-03
Payer: COMMERCIAL

## 2023-11-03 VITALS
HEART RATE: 87 BPM | OXYGEN SATURATION: 98 % | DIASTOLIC BLOOD PRESSURE: 66 MMHG | RESPIRATION RATE: 16 BRPM | SYSTOLIC BLOOD PRESSURE: 106 MMHG | TEMPERATURE: 96.3 F | BODY MASS INDEX: 28.69 KG/M2 | HEIGHT: 67 IN | WEIGHT: 182.8 LBS

## 2023-11-03 DIAGNOSIS — S46.912A STRAIN OF LEFT SHOULDER, INITIAL ENCOUNTER: ICD-10-CM

## 2023-11-03 DIAGNOSIS — R09.81 NASAL CONGESTION: ICD-10-CM

## 2023-11-03 DIAGNOSIS — J34.89 NASAL OBSTRUCTION: ICD-10-CM

## 2023-11-03 DIAGNOSIS — H11.31: ICD-10-CM

## 2023-11-03 DIAGNOSIS — S33.5XXS LUMBAR BACK SPRAIN, SEQUELA: ICD-10-CM

## 2023-11-03 PROBLEM — H57.89 REDNESS OF RIGHT EYE: Status: ACTIVE | Noted: 2023-11-03

## 2023-11-03 PROCEDURE — 99214 OFFICE O/P EST MOD 30 MIN: CPT | Performed by: NURSE PRACTITIONER

## 2023-11-03 PROCEDURE — 3078F DIAST BP <80 MM HG: CPT | Performed by: NURSE PRACTITIONER

## 2023-11-03 PROCEDURE — 3074F SYST BP LT 130 MM HG: CPT | Performed by: NURSE PRACTITIONER

## 2023-11-03 RX ORDER — FLUTICASONE PROPIONATE 50 MCG
1 SPRAY, SUSPENSION (ML) NASAL
Qty: 16 G | Refills: 1 | Status: SHIPPED | OUTPATIENT
Start: 2023-11-03 | End: 2023-11-21

## 2023-11-03 ASSESSMENT — FIBROSIS 4 INDEX: FIB4 SCORE: 0.47

## 2023-11-03 NOTE — ASSESSMENT & PLAN NOTE
Patient is following up on shoulder pain over 6 months.  He has completed physical therapy for shoulder and lower back, reports 95% improvement.  Will be going back to the gym to start lifting.

## 2023-11-03 NOTE — ASSESSMENT & PLAN NOTE
Patient reports mild redness in the lateral portion of the right eye started about 2 days ago, no pain, no discharge, no changes in vision, no discomfort.

## 2023-11-03 NOTE — PROGRESS NOTES
"Chief Complaint   Patient presents with    Follow-Up       HISTORY OF PRESENT ILLNESS: Patient is a 23 y.o. male, established patient who presents today to discuss medical problems as listed below:    Health Maintenance:  COMPLETED       Allergies: Cyclobenzaprine    Current Outpatient Medications   Medication Sig Dispense Refill    fluticasone (FLONASE) 50 MCG/ACT nasal spray Administer 1 Spray into affected nostril(S) at bedtime. 16 g 1     No current facility-administered medications for this visit.       Allergies, past medical history, past surgical history, family history, social history reviewed and updated.    Review of Systems:     - Constitutional: Negative for fever, chills, unexpected weight change, and fatigue/generalized weakness.     - HEENT: r ye redness, Negative for headaches, vision changes, hearing changes, ear pain, ear discharge, rhinorrhea, sinus congestion, sore throat, and neck pain.      - Respiratory: Negative for cough, sputum production, chest congestion, dyspnea, wheezing, and crackles.      - Cardiovascular: Negative for chest pain, palpitations, orthopnea, and bilateral lower extremity edema.         - Psychiatric/Behavioral: Negative for depression, suicidal/homicidal ideation and memory loss.      All other systems reviewed and are negative    Exam:    /66   Pulse 87   Temp (!) 35.7 °C (96.3 °F) (Temporal)   Resp 16   Ht 1.702 m (5' 7\")   Wt 82.9 kg (182 lb 12.8 oz)   SpO2 98%   BMI 28.63 kg/m²  Body mass index is 28.63 kg/m².    Physical Exam:  Constitutional: Well-developed and well-nourished. Not diaphoretic. No distress.   Eyes: mild subconjuctivitis in lateral segment of Right eye, likely 2/2 broken vessel   Cardiovascular: Regular rate and rhythm, S1 and S2 without murmur, rubs, or gallops.    Chest: Effort normal. Clear to auscultation throughout. No adventitious sounds. Neurological: Alert and oriented x 3.   Psychiatric:  Behavior, mood, and affect are " appropriate.  MA/nursing note and vitals reviewed.    Assessment/Plan:  Nasal obstruction  Chronic problem.  Intermittent congestion and obstruction of nasal cavity.  Taking multiple OTC allergy medicines without any improvement.  Has not tried Flonase.  Referral to ENT placed last visit, patient to call and schedule an appointment    Left shoulder strain  Patient is following up on shoulder pain over 6 months.  He has completed physical therapy for shoulder and lower back, reports 95% improvement.  Will be going back to the gym to start lifting.    Lumbar back sprain, sequela  Patient is following up for his back pain.  He completed physical therapy for his left shoulder and back, feeling 95% better.  Will be returning to the gym and start lifting.    Hyposphagma of right eye  Patient reports mild redness in the lateral portion of the right eye started about 2 days ago, no pain, no discharge, no changes in vision, no discomfort.    1. Nasal obstruction  Uncontrolled, stable.  Trial of Flonase.  Patient will be following up with ENT.  - fluticasone (FLONASE) 50 MCG/ACT nasal spray; Administer 1 Spray into affected nostril(S) at bedtime.  Dispense: 16 g; Refill: 1    2. Nasal congestion  - fluticasone (FLONASE) 50 MCG/ACT nasal spray; Administer 1 Spray into affected nostril(S) at bedtime.  Dispense: 16 g; Refill: 1    3. Strain of left shoulder, initial encounter  Improved    4. Lumbar back sprain, sequela  Improved    5. Hyposphagma of right eye  Uncontrolled, stable.  Likely secondary to broken vessel in the left eye.  If there are any changes such as decrease in vision, blindness, discharge, pain go to ED.  Otherwise compresses from temperature and keep eyes protected with eyeglasses, keep hands away from the eyes.  She is recovering its own.      Discussed with patient possible alternative diagnoses, patient is to take all medications as prescribed.      If symptoms persist FU w/PCP, if symptoms worsen go to  emergency room.      If experiencing any side effects from prescribed medications report to the office immediately or go to emergency room.     Reviewed indication, dosage, usage and potential adverse effects of prescribed medications.      Reviewed risks and benefits of treatment plan. Patient verbalizes understanding of all instruction and verbally agrees to plan.     Discussed plan with the patient, and patient agrees to the above.      I personally reviewed prior external notes and test results pertinent to today's visit.      No follow-ups on file. return if symptoms are not improving or getting worse

## 2023-11-03 NOTE — ASSESSMENT & PLAN NOTE
Patient is following up for his back pain.  He completed physical therapy for his left shoulder and back, feeling 95% better.  Will be returning to the gym and start lifting.

## 2023-11-03 NOTE — ASSESSMENT & PLAN NOTE
Chronic problem.  Intermittent congestion and obstruction of nasal cavity.  Taking multiple OTC allergy medicines without any improvement.  Has not tried Flonase.  Referral to ENT placed last visit, patient to call and schedule an appointment

## 2023-11-17 DIAGNOSIS — J34.89 NASAL OBSTRUCTION: ICD-10-CM

## 2023-11-17 DIAGNOSIS — R09.81 NASAL CONGESTION: ICD-10-CM

## 2023-11-21 RX ORDER — FLUTICASONE PROPIONATE 50 MCG
1 SPRAY, SUSPENSION (ML) NASAL
Qty: 48 ML | Refills: 1 | Status: SHIPPED | OUTPATIENT
Start: 2023-11-21

## 2024-03-01 ENCOUNTER — TELEMEDICINE (OUTPATIENT)
Dept: MEDICAL GROUP | Facility: MEDICAL CENTER | Age: 24
End: 2024-03-01
Payer: COMMERCIAL

## 2024-03-01 VITALS — HEIGHT: 67 IN | BODY MASS INDEX: 26.37 KG/M2 | WEIGHT: 168 LBS

## 2024-03-01 DIAGNOSIS — T78.40XA ALLERGY, INITIAL ENCOUNTER: ICD-10-CM

## 2024-03-01 DIAGNOSIS — Z02.9 ADMINISTRATIVE ENCOUNTER: ICD-10-CM

## 2024-03-01 DIAGNOSIS — J34.89 NASAL OBSTRUCTION: ICD-10-CM

## 2024-03-01 PROCEDURE — 99213 OFFICE O/P EST LOW 20 MIN: CPT | Mod: 95 | Performed by: NURSE PRACTITIONER

## 2024-03-01 ASSESSMENT — FIBROSIS 4 INDEX: FIB4 SCORE: 0.49

## 2024-03-01 ASSESSMENT — PATIENT HEALTH QUESTIONNAIRE - PHQ9: CLINICAL INTERPRETATION OF PHQ2 SCORE: 0

## 2024-03-01 NOTE — ASSESSMENT & PLAN NOTE
New problem.  Patient reports allergy to [place].  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  Takes over-the-counter Zyrtec as well as Benadryl.

## 2024-03-01 NOTE — PROGRESS NOTES
"Virtual Visit: Established Patient   This visit was conducted via Zoom using secure and encrypted videoconferencing technology.   The patient was in their home in the [facility].    The patient's identity was confirmed and verbal consent was obtained for this virtual visit.   This evaluation was conducted via Zoom using secure and encrypted videoconferencing technology. The patient was in their home in the [facility].    The patient's identity was confirmed and verbal consent was obtained for this virtual visit.     Subjective:   CC:   Chief Complaint   Patient presents with    Diabetes Follow-up     Needs dr note for allergy for work       Paxton Gómez is a 24 y.o. male presenting for evaluation and management of:    Administrative encounter  Pt states needing a doctor's note for allergy to rodents.   Pt works at [employer].   His symptoms include water ry eyes, itchy nose and throat. Taking OTC benadryl or Zyrtec.      ROS     Current medicines (including changes today)  Current Outpatient Medications   Medication Sig Dispense Refill    fluticasone (FLONASE) 50 MCG/ACT nasal spray ADMINISTER 1 SPRAY INTO AFFECTED NOSTRIL(S) AT BEDTIME. 48 mL 1     No current facility-administered medications for this visit.       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  Skin: No rashes in visible areas.  Eye: Round. Conjunctiva clear, lids normal. No icterus.   ENMT: Lips pink without lesions, good dentition, moist " mucous membranes. Phonation normal.  Neck: No masses, no thyromegaly. Moves freely without pain.  Respiratory: Unlabored respiratory effort, no cough or audible wheeze  Psych: Alert and oriented x3, normal affect and mood.     Assessment and Plan:   The following treatment plan was discussed:   1. Administrative encounter  Letter written and will be attached to my chart for patient    2. Allergy, initial encounter  Uncontrolled, stable.  Continue OTC medication utilization.    3. Nasal obstruction  Stable.  Utilizing Flonase nasal spray.  No issues today.    Discussed with patient possible alternative diagnoses, patient is to take all medications as prescribed.      If symptoms persist FU w/PCP, if symptoms worsen go to emergency room.      If experiencing any side effects from prescribed medications report to the office immediately or go to emergency room.     Reviewed indication, dosage, usage and potential adverse effects of prescribed medications.      Reviewed risks and benefits of treatment plan. Patient verbalizes understanding of all instruction and verbally agrees to plan.     Discussed plan with the patient, and patient agrees to the above.      I personally reviewed prior external notes and test results pertinent to today's visit.     Follow-up: annual, PRN

## 2024-03-01 NOTE — ASSESSMENT & PLAN NOTE
Pt states needing a doctor's note for allergy to rodents.   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   His symptoms include water ry eyes, itchy nose and throat. Taking OTC benadryl or Zyrtec.

## 2024-03-01 NOTE — LETTER
March 1, 2024       Patient: Paxton Gómez   YOB: 2000   Date of Visit: 3/1/2024         To Whom It May Concern:    Paxton Gómez was seen today. Allergy to rodents reported. Allergies exacerbated by exposure to rodents. Please accommodate his work environment  to assist with this matter by avoiding exposure to known allergen, rodents.     If you have any questions or concerns, please don't hesitate to call [phone]          Sincerely,          RIKKI Stephens.  Electronically Signed

## 2024-03-02 NOTE — ASSESSMENT & PLAN NOTE
Chronic intermittent issue.  Frequent nasal congestion.  Patient has allergies.  Frequent flareups from allergies from work.  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  Utilizing Flonase, Zyrtec and Benadryl.

## 2024-05-30 ENCOUNTER — APPOINTMENT (OUTPATIENT)
Dept: MEDICAL GROUP | Facility: MEDICAL CENTER | Age: 24
End: 2024-05-30
Payer: COMMERCIAL

## 2025-04-30 SDOH — HEALTH STABILITY: PHYSICAL HEALTH: ON AVERAGE, HOW MANY MINUTES DO YOU ENGAGE IN EXERCISE AT THIS LEVEL?: 60 MIN

## 2025-04-30 SDOH — ECONOMIC STABILITY: INCOME INSECURITY: IN THE LAST 12 MONTHS, WAS THERE A TIME WHEN YOU WERE NOT ABLE TO PAY THE MORTGAGE OR RENT ON TIME?: NO

## 2025-04-30 SDOH — ECONOMIC STABILITY: FOOD INSECURITY: WITHIN THE PAST 12 MONTHS, THE FOOD YOU BOUGHT JUST DIDN'T LAST AND YOU DIDN'T HAVE MONEY TO GET MORE.: NEVER TRUE

## 2025-04-30 SDOH — ECONOMIC STABILITY: HOUSING INSECURITY
IN THE LAST 12 MONTHS, WAS THERE A TIME WHEN YOU DID NOT HAVE A STEADY PLACE TO SLEEP OR SLEPT IN A SHELTER (INCLUDING NOW)?: NO

## 2025-04-30 SDOH — ECONOMIC STABILITY: FOOD INSECURITY: WITHIN THE PAST 12 MONTHS, YOU WORRIED THAT YOUR FOOD WOULD RUN OUT BEFORE YOU GOT MONEY TO BUY MORE.: NEVER TRUE

## 2025-04-30 SDOH — ECONOMIC STABILITY: TRANSPORTATION INSECURITY
IN THE PAST 12 MONTHS, HAS LACK OF TRANSPORTATION KEPT YOU FROM MEETINGS, WORK, OR FROM GETTING THINGS NEEDED FOR DAILY LIVING?: NO

## 2025-04-30 SDOH — ECONOMIC STABILITY: TRANSPORTATION INSECURITY
IN THE PAST 12 MONTHS, HAS THE LACK OF TRANSPORTATION KEPT YOU FROM MEDICAL APPOINTMENTS OR FROM GETTING MEDICATIONS?: NO

## 2025-04-30 SDOH — ECONOMIC STABILITY: INCOME INSECURITY: HOW HARD IS IT FOR YOU TO PAY FOR THE VERY BASICS LIKE FOOD, HOUSING, MEDICAL CARE, AND HEATING?: NOT HARD AT ALL

## 2025-04-30 SDOH — HEALTH STABILITY: PHYSICAL HEALTH: ON AVERAGE, HOW MANY DAYS PER WEEK DO YOU ENGAGE IN MODERATE TO STRENUOUS EXERCISE (LIKE A BRISK WALK)?: 7 DAYS

## 2025-04-30 SDOH — HEALTH STABILITY: MENTAL HEALTH
STRESS IS WHEN SOMEONE FEELS TENSE, NERVOUS, ANXIOUS, OR CAN'T SLEEP AT NIGHT BECAUSE THEIR MIND IS TROUBLED. HOW STRESSED ARE YOU?: ONLY A LITTLE

## 2025-04-30 SDOH — ECONOMIC STABILITY: TRANSPORTATION INSECURITY
IN THE PAST 12 MONTHS, HAS LACK OF RELIABLE TRANSPORTATION KEPT YOU FROM MEDICAL APPOINTMENTS, MEETINGS, WORK OR FROM GETTING THINGS NEEDED FOR DAILY LIVING?: NO

## 2025-04-30 ASSESSMENT — SOCIAL DETERMINANTS OF HEALTH (SDOH)
DO YOU BELONG TO ANY CLUBS OR ORGANIZATIONS SUCH AS CHURCH GROUPS UNIONS, FRATERNAL OR ATHLETIC GROUPS, OR SCHOOL GROUPS?: NO
ARE YOU MARRIED, WIDOWED, DIVORCED, SEPARATED, NEVER MARRIED, OR LIVING WITH A PARTNER?: LIVING WITH PARTNER
HOW OFTEN DO YOU GET TOGETHER WITH FRIENDS OR RELATIVES?: ONCE A WEEK
HOW OFTEN DO YOU GET TOGETHER WITH FRIENDS OR RELATIVES?: ONCE A WEEK
HOW OFTEN DO YOU HAVE A DRINK CONTAINING ALCOHOL: 2-4 TIMES A MONTH
HOW OFTEN DO YOU ATTENT MEETINGS OF THE CLUB OR ORGANIZATION YOU BELONG TO?: NEVER
IN A TYPICAL WEEK, HOW MANY TIMES DO YOU TALK ON THE PHONE WITH FAMILY, FRIENDS, OR NEIGHBORS?: NEVER
HOW OFTEN DO YOU ATTEND CHURCH OR RELIGIOUS SERVICES?: MORE THAN 4 TIMES PER YEAR
HOW OFTEN DO YOU HAVE SIX OR MORE DRINKS ON ONE OCCASION: MONTHLY
WITHIN THE PAST 12 MONTHS, YOU WORRIED THAT YOUR FOOD WOULD RUN OUT BEFORE YOU GOT THE MONEY TO BUY MORE: NEVER TRUE
HOW OFTEN DO YOU ATTENT MEETINGS OF THE CLUB OR ORGANIZATION YOU BELONG TO?: NEVER
IN A TYPICAL WEEK, HOW MANY TIMES DO YOU TALK ON THE PHONE WITH FAMILY, FRIENDS, OR NEIGHBORS?: NEVER
HOW HARD IS IT FOR YOU TO PAY FOR THE VERY BASICS LIKE FOOD, HOUSING, MEDICAL CARE, AND HEATING?: NOT HARD AT ALL
HOW OFTEN DO YOU ATTEND CHURCH OR RELIGIOUS SERVICES?: MORE THAN 4 TIMES PER YEAR
IN THE PAST 12 MONTHS, HAS THE ELECTRIC, GAS, OIL, OR WATER COMPANY THREATENED TO SHUT OFF SERVICE IN YOUR HOME?: NO
ARE YOU MARRIED, WIDOWED, DIVORCED, SEPARATED, NEVER MARRIED, OR LIVING WITH A PARTNER?: LIVING WITH PARTNER
DO YOU BELONG TO ANY CLUBS OR ORGANIZATIONS SUCH AS CHURCH GROUPS UNIONS, FRATERNAL OR ATHLETIC GROUPS, OR SCHOOL GROUPS?: NO
HOW MANY DRINKS CONTAINING ALCOHOL DO YOU HAVE ON A TYPICAL DAY WHEN YOU ARE DRINKING: 5 OR 6

## 2025-04-30 ASSESSMENT — LIFESTYLE VARIABLES
AUDIT-C TOTAL SCORE: 6
SKIP TO QUESTIONS 9-10: 0
HOW MANY STANDARD DRINKS CONTAINING ALCOHOL DO YOU HAVE ON A TYPICAL DAY: 5 OR 6
HOW OFTEN DO YOU HAVE SIX OR MORE DRINKS ON ONE OCCASION: MONTHLY
HOW OFTEN DO YOU HAVE A DRINK CONTAINING ALCOHOL: 2-4 TIMES A MONTH

## 2025-05-01 ENCOUNTER — APPOINTMENT (OUTPATIENT)
Dept: MEDICAL GROUP | Facility: MEDICAL CENTER | Age: 25
End: 2025-05-01
Payer: COMMERCIAL

## 2025-05-01 VITALS
DIASTOLIC BLOOD PRESSURE: 68 MMHG | OXYGEN SATURATION: 97 % | WEIGHT: 168.87 LBS | BODY MASS INDEX: 25.59 KG/M2 | HEART RATE: 86 BPM | SYSTOLIC BLOOD PRESSURE: 122 MMHG | TEMPERATURE: 98.2 F | HEIGHT: 68 IN

## 2025-05-01 DIAGNOSIS — J02.9 SORE THROAT: Primary | ICD-10-CM

## 2025-05-01 DIAGNOSIS — R51.9 NONINTRACTABLE HEADACHE, UNSPECIFIED CHRONICITY PATTERN, UNSPECIFIED HEADACHE TYPE: ICD-10-CM

## 2025-05-01 DIAGNOSIS — Z00.00 PE (PHYSICAL EXAM), ANNUAL: ICD-10-CM

## 2025-05-01 DIAGNOSIS — Z91.09 ENVIRONMENTAL ALLERGIES: ICD-10-CM

## 2025-05-01 DIAGNOSIS — R09.81 SINUS CONGESTION: ICD-10-CM

## 2025-05-01 DIAGNOSIS — Z11.3 ROUTINE SCREENING FOR STI (SEXUALLY TRANSMITTED INFECTION): ICD-10-CM

## 2025-05-01 DIAGNOSIS — R52 BODY ACHES: ICD-10-CM

## 2025-05-01 PROBLEM — T78.40XA ALLERGIES: Status: RESOLVED | Noted: 2024-03-01 | Resolved: 2025-05-01

## 2025-05-01 PROBLEM — B07.0 PLANTAR WART: Status: RESOLVED | Noted: 2018-06-27 | Resolved: 2025-05-01

## 2025-05-01 PROBLEM — Z02.9 ADMINISTRATIVE ENCOUNTER: Status: RESOLVED | Noted: 2024-03-01 | Resolved: 2025-05-01

## 2025-05-01 LAB
FLUAV RNA SPEC QL NAA+PROBE: NEGATIVE
FLUBV RNA SPEC QL NAA+PROBE: NEGATIVE
RSV RNA SPEC QL NAA+PROBE: NEGATIVE
S PYO DNA SPEC NAA+PROBE: NOT DETECTED
SARS-COV-2 RNA RESP QL NAA+PROBE: NEGATIVE

## 2025-05-01 PROCEDURE — 3078F DIAST BP <80 MM HG: CPT | Performed by: NURSE PRACTITIONER

## 2025-05-01 PROCEDURE — 87651 STREP A DNA AMP PROBE: CPT | Performed by: NURSE PRACTITIONER

## 2025-05-01 PROCEDURE — 99395 PREV VISIT EST AGE 18-39: CPT | Performed by: NURSE PRACTITIONER

## 2025-05-01 PROCEDURE — 99214 OFFICE O/P EST MOD 30 MIN: CPT | Mod: 25 | Performed by: NURSE PRACTITIONER

## 2025-05-01 PROCEDURE — 3074F SYST BP LT 130 MM HG: CPT | Performed by: NURSE PRACTITIONER

## 2025-05-01 PROCEDURE — 0241U POCT CEPHEID COV-2, FLU A/B, RSV - PCR: CPT | Performed by: NURSE PRACTITIONER

## 2025-05-01 RX ORDER — GUAIFENESIN 1200 MG/1
1200 TABLET, EXTENDED RELEASE ORAL
Qty: 14 TABLET | Refills: 0 | Status: SHIPPED | OUTPATIENT
Start: 2025-05-01 | End: 2025-05-08

## 2025-05-01 SDOH — ECONOMIC STABILITY: FOOD INSECURITY: WITHIN THE PAST 12 MONTHS, THE FOOD YOU BOUGHT JUST DIDN'T LAST AND YOU DIDN'T HAVE MONEY TO GET MORE.: NEVER TRUE

## 2025-05-01 SDOH — ECONOMIC STABILITY: INCOME INSECURITY: HOW HARD IS IT FOR YOU TO PAY FOR THE VERY BASICS LIKE FOOD, HOUSING, MEDICAL CARE, AND HEATING?: NOT HARD AT ALL

## 2025-05-01 SDOH — ECONOMIC STABILITY: INCOME INSECURITY: IN THE LAST 12 MONTHS, WAS THERE A TIME WHEN YOU WERE NOT ABLE TO PAY THE MORTGAGE OR RENT ON TIME?: NO

## 2025-05-01 SDOH — HEALTH STABILITY: PHYSICAL HEALTH: ON AVERAGE, HOW MANY DAYS PER WEEK DO YOU ENGAGE IN MODERATE TO STRENUOUS EXERCISE (LIKE A BRISK WALK)?: 7 DAYS

## 2025-05-01 SDOH — ECONOMIC STABILITY: FOOD INSECURITY: WITHIN THE PAST 12 MONTHS, YOU WORRIED THAT YOUR FOOD WOULD RUN OUT BEFORE YOU GOT MONEY TO BUY MORE.: NEVER TRUE

## 2025-05-01 SDOH — HEALTH STABILITY: PHYSICAL HEALTH: ON AVERAGE, HOW MANY MINUTES DO YOU ENGAGE IN EXERCISE AT THIS LEVEL?: 60 MIN

## 2025-05-01 ASSESSMENT — LIFESTYLE VARIABLES
HOW OFTEN DO YOU HAVE A DRINK CONTAINING ALCOHOL: 2-4 TIMES A MONTH
SKIP TO QUESTIONS 9-10: 0
AUDIT-C TOTAL SCORE: 6
HOW MANY STANDARD DRINKS CONTAINING ALCOHOL DO YOU HAVE ON A TYPICAL DAY: 5 OR 6
HOW OFTEN DO YOU HAVE SIX OR MORE DRINKS ON ONE OCCASION: MONTHLY

## 2025-05-01 ASSESSMENT — SOCIAL DETERMINANTS OF HEALTH (SDOH)
HOW OFTEN DO YOU ATTEND CHURCH OR RELIGIOUS SERVICES?: MORE THAN 4 TIMES PER YEAR
DO YOU BELONG TO ANY CLUBS OR ORGANIZATIONS SUCH AS CHURCH GROUPS UNIONS, FRATERNAL OR ATHLETIC GROUPS, OR SCHOOL GROUPS?: NO
HOW OFTEN DO YOU ATTEND CHURCH OR RELIGIOUS SERVICES?: MORE THAN 4 TIMES PER YEAR
HOW OFTEN DO YOU HAVE SIX OR MORE DRINKS ON ONE OCCASION: MONTHLY
HOW OFTEN DO YOU ATTENT MEETINGS OF THE CLUB OR ORGANIZATION YOU BELONG TO?: NEVER
HOW OFTEN DO YOU GET TOGETHER WITH FRIENDS OR RELATIVES?: ONCE A WEEK
WITHIN THE PAST 12 MONTHS, YOU WORRIED THAT YOUR FOOD WOULD RUN OUT BEFORE YOU GOT THE MONEY TO BUY MORE: NEVER TRUE
ARE YOU MARRIED, WIDOWED, DIVORCED, SEPARATED, NEVER MARRIED, OR LIVING WITH A PARTNER?: LIVING WITH PARTNER
HOW MANY DRINKS CONTAINING ALCOHOL DO YOU HAVE ON A TYPICAL DAY WHEN YOU ARE DRINKING: 5 OR 6
ARE YOU MARRIED, WIDOWED, DIVORCED, SEPARATED, NEVER MARRIED, OR LIVING WITH A PARTNER?: LIVING WITH PARTNER
HOW OFTEN DO YOU GET TOGETHER WITH FRIENDS OR RELATIVES?: ONCE A WEEK
HOW OFTEN DO YOU ATTENT MEETINGS OF THE CLUB OR ORGANIZATION YOU BELONG TO?: NEVER
IN A TYPICAL WEEK, HOW MANY TIMES DO YOU TALK ON THE PHONE WITH FAMILY, FRIENDS, OR NEIGHBORS?: NEVER
HOW HARD IS IT FOR YOU TO PAY FOR THE VERY BASICS LIKE FOOD, HOUSING, MEDICAL CARE, AND HEATING?: NOT HARD AT ALL
DO YOU BELONG TO ANY CLUBS OR ORGANIZATIONS SUCH AS CHURCH GROUPS UNIONS, FRATERNAL OR ATHLETIC GROUPS, OR SCHOOL GROUPS?: NO
IN A TYPICAL WEEK, HOW MANY TIMES DO YOU TALK ON THE PHONE WITH FAMILY, FRIENDS, OR NEIGHBORS?: NEVER
IN THE PAST 12 MONTHS, HAS THE ELECTRIC, GAS, OIL, OR WATER COMPANY THREATENED TO SHUT OFF SERVICE IN YOUR HOME?: NO
HOW OFTEN DO YOU HAVE A DRINK CONTAINING ALCOHOL: 2-4 TIMES A MONTH

## 2025-05-01 ASSESSMENT — ENCOUNTER SYMPTOMS
WEAKNESS: 0
BRUISES/BLEEDS EASILY: 0
WEIGHT LOSS: 0
POLYDIPSIA: 0
SENSORY CHANGE: 0
BLOOD IN STOOL: 0
FEVER: 0
SORE THROAT: 1
CONSTIPATION: 0
INSOMNIA: 0
MYALGIAS: 0
BACK PAIN: 0
SINUS PAIN: 0
FLANK PAIN: 0
EYE DISCHARGE: 0
SHORTNESS OF BREATH: 0
COUGH: 0
EYE PAIN: 0
DIZZINESS: 0
HEADACHES: 0
LOSS OF CONSCIOUSNESS: 0
SPUTUM PRODUCTION: 0
EYE REDNESS: 0
WHEEZING: 0
DEPRESSION: 0
PALPITATIONS: 0
NAUSEA: 0
VOMITING: 0
DIARRHEA: 0
TREMORS: 0
CHILLS: 0
ABDOMINAL PAIN: 0
FOCAL WEAKNESS: 0
SPEECH CHANGE: 0
NERVOUS/ANXIOUS: 0

## 2025-05-01 ASSESSMENT — PATIENT HEALTH QUESTIONNAIRE - PHQ9: CLINICAL INTERPRETATION OF PHQ2 SCORE: 0

## 2025-05-01 ASSESSMENT — FIBROSIS 4 INDEX: FIB4 SCORE: 0.51

## 2025-05-01 NOTE — PROGRESS NOTES
Patient agreed to using CHIC[facility]: yes    Paxton was seen today for requesting labs, annual exam, sore throat and cough.    Diagnoses and all orders for this visit:    Sore throat  -     POCT Cepheid Group A Strep - PCR  -     POCT Cepheid CoV-2, Flu A/B, RSV - PCR    Sinus congestion  -     Guaifenesin 1200 MG TABLET SR 12 HR; Take 1 Tablet by mouth 2 times a day for 7 days.    Body aches    PE (physical exam), annual  -     CBC WITHOUT DIFFERENTIAL; Future  -     Comp Metabolic Panel; Future  -     Lipid Profile; Future  -     TSH WITH REFLEX TO FT4; Future  -     VITAMIN D,25 HYDROXY (DEFICIENCY); Future  -     HEMOGLOBIN A1C; Future  -     Testosterone, Free & Total, Adult Male (w/SHBG); Future  -     LUTEINIZING HORMONE SERUM; Future  -     FSH; Future    Routine screening for STI (sexually transmitted infection)  -     Chlamydia/GC, PCR (Urine); Future  -     HIV AG/AB COMBO ASSAY SCREENING; Future  -     HEP C VIRUS ANTIBODY; Future  -     T.PALLIDUM AB GILBERT (SCREENING); Future    Environmental allergies    Nonintractable headache, unspecified chronicity pattern, unspecified headache type              Assessment & Plan  1. Upper respiratory infection  2.  Sore throat  3.  Sinus congestion  4.  Body aches  5.  Headache  - Reports experiencing upper respiratory symptoms, including sore throat and sinus congestion, without fever, ear pain, shortness of breath, or runny nose  - Mild headaches and body aches noted, with symptoms present for 4 days and some improvement observed  - Physical examination reveals a slightly red throat, but no difficulty swallowing or other concerning findings  - Prescription for medication to alleviate phlegm and congestion provided  - Advised to continue using Flonase as it has been effective  - Symptoms are improving, continue supportive care, fluids and rest.      6. Health maintenance  7.  Annual physical exam  - Last blood work conducted 2 years ago  - Routine blood work ordered to assess  kidney and liver function, diabetes, thyroid, and vitamin D levels  - Advised to fast for 8 hours prior to the test and ensure adequate hydration  - Testosterone level test included  - Instructed to provide a urine sample for chlamydia and gonorrhea testing  - Routine screening for HIV, hepatitis C, and syphilis will be performed  - Will be contacted if any abnormalities are detected; otherwise, no further contact will be made    8.  Environmental allergies  Stable condition.,  Continue Flonase and as needed basis.    History of Present Illness  The patient is a 25-year-old male who presents for evaluation of upper respiratory symptoms and health maintenance.    Upper respiratory symptoms, including a sore throat and sinus congestion, are reported. No fever, ear pain, shortness of breath, or runny nose is noted. Mild headaches and body aches are also mentioned. These symptoms have been present for 4 days, with some improvement noted. Flonase has been used and appears to be beneficial.    Interest in screening for sexually transmitted diseases (STDs) is expressed due to a recent incident where skin was scraped on a public toilet seat during a family visit to [place]. This resulted in the appearance of small red bumps that have since resolved. Testosterone level testing is also requested. No symptoms of depression or anxiety are reported, but a typical sleep latency of approximately 1 hour is mentioned. Allergies are currently well-managed. No dysphagia, nausea, or vomiting is reported.  Health Maintenance  Below Anticipatory guidance discussed with patient  Cholesterol Screening: labs  Diabetes Screening: labs  Aspirin Use: no    Diet: regular   Exercise: active   Substance Abuse: no   Safe in relationship.   Seat belts, bike helmet, gun safety discussed.  Sun protection used.      Infectious disease screening/Immunizations  --STI Screening: ordered    --Practices safe sex.  --HIV Screening:    --Hepatitis C  Screening:    --Immunizations:      Review of Systems   Constitutional:  Negative for chills, fever, malaise/fatigue and weight loss.   HENT:  Positive for congestion and sore throat. Negative for ear discharge, hearing loss and sinus pain.    Eyes:  Negative for pain, discharge and redness.   Respiratory:  Negative for cough, sputum production, shortness of breath and wheezing.    Cardiovascular:  Negative for chest pain, palpitations and leg swelling.   Gastrointestinal:  Negative for abdominal pain, blood in stool, constipation, diarrhea, nausea and vomiting.   Genitourinary:  Negative for dysuria, flank pain, frequency, hematuria and urgency.   Musculoskeletal:  Negative for back pain, joint pain and myalgias.   Skin:  Negative for itching and rash.   Neurological:  Negative for dizziness, tremors, sensory change, speech change, focal weakness, loss of consciousness, weakness and headaches.   Endo/Heme/Allergies:  Negative for environmental allergies and polydipsia. Does not bruise/bleed easily.   Psychiatric/Behavioral:  Negative for depression. The patient is not nervous/anxious and does not have insomnia.         He  has a past medical history of Left sided sciatica (6/25/2018), NEGATIVE HISTORY OF, and Plantar wart (6/27/2018).    He has no past medical history of Asthma, Cancer (HCC), Diabetes (HCC), Hyperlipidemia, or Hypertension.  He  has no past surgical history on file.  Family History   Problem Relation Age of Onset    Diabetes Father     Diabetes Maternal Grandmother     Diabetes Maternal Grandfather     Diabetes Paternal Grandmother     Diabetes Paternal Grandfather     Genitourinary () Problems Neg Hx     Cancer Neg Hx     Stroke Neg Hx     Heart Disease Neg Hx      Social History     Tobacco Use    Smoking status: Never    Smokeless tobacco: Never   Vaping Use    Vaping status: Never Used   Substance Use Topics    Alcohol use: Yes     Alcohol/week: 4.2 oz     Types: 7 Cans of beer per week      "Comment: a beer a day    Drug use: No     Patient Active Problem List    Diagnosis Date Noted    PE (physical exam), annual 05/01/2025    Sinus congestion 05/01/2025    Sore throat 05/01/2025    Body aches 05/01/2025    Environmental allergies 05/01/2025    Nonintractable headache 05/01/2025    Hyposphagma of right eye 11/03/2023    Left shoulder strain 09/22/2023    Nasal obstruction 09/22/2023    Lumbar back sprain, sequela 07/19/2023    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 14 Tablet 0    fluticasone (FLONASE) 50 MCG/ACT nasal spray ADMINISTER 1 SPRAY INTO AFFECTED NOSTRIL(S) AT BEDTIME. 48 mL 1     No current facility-administered medications for this visit.    (including changes today)  Allergies: Cyclobenzaprine    /68   Pulse 86   Temp 36.8 °C (98.2 °F) (Temporal)   Ht 1.73 m (5' 8.11\")   Wt 76.6 kg (168 lb 14 oz)   SpO2 97%      Physical Exam  Constitutional:       General: He is not in acute distress.     Appearance: Normal appearance. He is normal weight. He is not ill-appearing.   HENT:      Head: Normocephalic and atraumatic.      Right Ear: Tympanic membrane, ear canal and external ear normal. There is no impacted cerumen.      Left Ear: Tympanic membrane, ear canal and external ear normal. There is no impacted cerumen.      Nose: Congestion present. No rhinorrhea.      Mouth/Throat:      Mouth: Mucous membranes are moist.      Pharynx: No oropharyngeal exudate or posterior oropharyngeal erythema.   Eyes:      General:         Right eye: No discharge.         Left eye: No discharge.      Pupils: Pupils are equal, round, and reactive to light.   Cardiovascular:      Rate and Rhythm: Normal rate.      Pulses: Normal pulses.      Heart sounds: Normal heart " sounds. No murmur heard.     No friction rub. No gallop.   Pulmonary:      Effort: Pulmonary effort is normal. No respiratory distress.      Breath sounds: Normal breath sounds. No wheezing, rhonchi or rales.   Abdominal:      General: Bowel sounds are normal. There is no distension.      Palpations: Abdomen is soft. There is no mass.      Tenderness: There is no abdominal tenderness. There is no right CVA tenderness, left CVA tenderness, guarding or rebound.      Hernia: No hernia is present.   Musculoskeletal:         General: No swelling, tenderness or deformity. Normal range of motion.      Cervical back: Normal range of motion and neck supple.      Right lower leg: No edema.      Left lower leg: No edema.   Lymphadenopathy:      Cervical: No cervical adenopathy.   Skin:     General: Skin is warm.      Findings: No rash.   Neurological:      General: No focal deficit present.      Mental Status: He is alert. Mental status is at baseline.      Cranial Nerves: No cranial nerve deficit.      Sensory: No sensory deficit.      Motor: No weakness.      Coordination: Coordination normal.      Gait: Gait normal.   Psychiatric:         Mood and Affect: Mood normal.         Behavior: Behavior normal.          Results         No follow-ups on file. Annual, PRN

## 2025-05-08 ENCOUNTER — HOSPITAL ENCOUNTER (OUTPATIENT)
Dept: LAB | Facility: MEDICAL CENTER | Age: 25
End: 2025-05-08
Attending: NURSE PRACTITIONER
Payer: COMMERCIAL

## 2025-05-08 DIAGNOSIS — Z11.3 ROUTINE SCREENING FOR STI (SEXUALLY TRANSMITTED INFECTION): ICD-10-CM

## 2025-05-08 DIAGNOSIS — Z00.00 PE (PHYSICAL EXAM), ANNUAL: ICD-10-CM

## 2025-05-08 LAB
C TRACH DNA SPEC QL NAA+PROBE: NEGATIVE
ERYTHROCYTE [DISTWIDTH] IN BLOOD BY AUTOMATED COUNT: 42.1 FL (ref 35.9–50)
EST. AVERAGE GLUCOSE BLD GHB EST-MCNC: 108 MG/DL
HBA1C MFR BLD: 5.4 % (ref 4–5.6)
HCT VFR BLD AUTO: 44.8 % (ref 42–52)
HGB BLD-MCNC: 14.9 G/DL (ref 14–18)
MCH RBC QN AUTO: 31.3 PG (ref 27–33)
MCHC RBC AUTO-ENTMCNC: 33.3 G/DL (ref 32.3–36.5)
MCV RBC AUTO: 94.1 FL (ref 81.4–97.8)
N GONORRHOEA DNA SPEC QL NAA+PROBE: NEGATIVE
PLATELET # BLD AUTO: 359 K/UL (ref 164–446)
PMV BLD AUTO: 9.9 FL (ref 9–12.9)
RBC # BLD AUTO: 4.76 M/UL (ref 4.7–6.1)
SPECIMEN SOURCE: NORMAL
WBC # BLD AUTO: 4.8 K/UL (ref 4.8–10.8)

## 2025-05-08 PROCEDURE — 84443 ASSAY THYROID STIM HORMONE: CPT

## 2025-05-08 PROCEDURE — 83002 ASSAY OF GONADOTROPIN (LH): CPT

## 2025-05-08 PROCEDURE — 87591 N.GONORRHOEAE DNA AMP PROB: CPT

## 2025-05-08 PROCEDURE — 80053 COMPREHEN METABOLIC PANEL: CPT

## 2025-05-08 PROCEDURE — 85027 COMPLETE CBC AUTOMATED: CPT

## 2025-05-08 PROCEDURE — 84270 ASSAY OF SEX HORMONE GLOBUL: CPT

## 2025-05-08 PROCEDURE — 83036 HEMOGLOBIN GLYCOSYLATED A1C: CPT

## 2025-05-08 PROCEDURE — 82306 VITAMIN D 25 HYDROXY: CPT

## 2025-05-08 PROCEDURE — 87389 HIV-1 AG W/HIV-1&-2 AB AG IA: CPT

## 2025-05-08 PROCEDURE — 86803 HEPATITIS C AB TEST: CPT

## 2025-05-08 PROCEDURE — 83001 ASSAY OF GONADOTROPIN (FSH): CPT

## 2025-05-08 PROCEDURE — 84403 ASSAY OF TOTAL TESTOSTERONE: CPT

## 2025-05-08 PROCEDURE — 86780 TREPONEMA PALLIDUM: CPT

## 2025-05-08 PROCEDURE — 84402 ASSAY OF FREE TESTOSTERONE: CPT

## 2025-05-08 PROCEDURE — 36415 COLL VENOUS BLD VENIPUNCTURE: CPT

## 2025-05-08 PROCEDURE — 87491 CHLMYD TRACH DNA AMP PROBE: CPT

## 2025-05-08 PROCEDURE — 80061 LIPID PANEL: CPT

## 2025-05-09 LAB
25(OH)D3 SERPL-MCNC: 25 NG/ML (ref 30–100)
ALBUMIN SERPL BCP-MCNC: 4.4 G/DL (ref 3.2–4.9)
ALBUMIN/GLOB SERPL: 1.4 G/DL
ALP SERPL-CCNC: 85 U/L (ref 30–99)
ALT SERPL-CCNC: 46 U/L (ref 2–50)
ANION GAP SERPL CALC-SCNC: 10 MMOL/L (ref 7–16)
AST SERPL-CCNC: 29 U/L (ref 12–45)
BILIRUB SERPL-MCNC: 0.3 MG/DL (ref 0.1–1.5)
BUN SERPL-MCNC: 23 MG/DL (ref 8–22)
CALCIUM ALBUM COR SERPL-MCNC: 9.3 MG/DL (ref 8.5–10.5)
CALCIUM SERPL-MCNC: 9.6 MG/DL (ref 8.5–10.5)
CHLORIDE SERPL-SCNC: 107 MMOL/L (ref 96–112)
CHOLEST SERPL-MCNC: 150 MG/DL (ref 100–199)
CO2 SERPL-SCNC: 23 MMOL/L (ref 20–33)
CREAT SERPL-MCNC: 1.09 MG/DL (ref 0.5–1.4)
FASTING STATUS PATIENT QL REPORTED: NORMAL
FSH SERPL-ACNC: 5.2 MIU/ML (ref 1.5–12.4)
GFR SERPLBLD CREATININE-BSD FMLA CKD-EPI: 95 ML/MIN/1.73 M 2
GLOBULIN SER CALC-MCNC: 3.2 G/DL (ref 1.9–3.5)
GLUCOSE SERPL-MCNC: 93 MG/DL (ref 65–99)
HCV AB SER QL: NORMAL
HDLC SERPL-MCNC: 44 MG/DL
HIV 1+2 AB+HIV1 P24 AG SERPL QL IA: NORMAL
LDLC SERPL CALC-MCNC: 90 MG/DL
LH SERPL-ACNC: 4.6 IU/L (ref 1.7–8.6)
POTASSIUM SERPL-SCNC: 4.5 MMOL/L (ref 3.6–5.5)
PROT SERPL-MCNC: 7.6 G/DL (ref 6–8.2)
SODIUM SERPL-SCNC: 140 MMOL/L (ref 135–145)
T PALLIDUM AB SER QL IA: NORMAL
TRIGL SERPL-MCNC: 78 MG/DL (ref 0–149)
TSH SERPL DL<=0.005 MIU/L-ACNC: 0.91 UIU/ML (ref 0.38–5.33)

## 2025-05-10 LAB
SHBG SERPL-SCNC: 13 NMOL/L (ref 17–56)
TESTOST FREE MFR SERPL: 2.7 % (ref 1.6–2.9)
TESTOST FREE SERPL-MCNC: 121 PG/ML (ref 47–244)
TESTOST SERPL-MCNC: 448 NG/DL (ref 300–1080)